# Patient Record
Sex: MALE | Race: WHITE
[De-identification: names, ages, dates, MRNs, and addresses within clinical notes are randomized per-mention and may not be internally consistent; named-entity substitution may affect disease eponyms.]

---

## 2017-04-18 ENCOUNTER — HOSPITAL ENCOUNTER (EMERGENCY)
Dept: HOSPITAL 8 - ED | Age: 57
Discharge: HOME | End: 2017-04-18
Payer: OTHER GOVERNMENT

## 2017-04-18 VITALS — BODY MASS INDEX: 24.7 KG/M2 | WEIGHT: 202.83 LBS | HEIGHT: 76 IN

## 2017-04-18 VITALS — SYSTOLIC BLOOD PRESSURE: 134 MMHG | DIASTOLIC BLOOD PRESSURE: 78 MMHG

## 2017-04-18 DIAGNOSIS — I10: ICD-10-CM

## 2017-04-18 DIAGNOSIS — S39.012A: Primary | ICD-10-CM
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
## 2017-04-18 DIAGNOSIS — I25.2: ICD-10-CM
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
## 2017-04-18 PROCEDURE — 72220 X-RAY EXAM SACRUM TAILBONE: CPT

## 2017-04-18 PROCEDURE — 72110 X-RAY EXAM L-2 SPINE 4/>VWS: CPT

## 2017-04-18 PROCEDURE — 73610 X-RAY EXAM OF ANKLE: CPT

## 2017-04-18 PROCEDURE — 99284 EMERGENCY DEPT VISIT MOD MDM: CPT

## 2017-04-18 PROCEDURE — 96372 THER/PROPH/DIAG INJ SC/IM: CPT

## 2017-12-19 ENCOUNTER — HOSPITAL ENCOUNTER (INPATIENT)
Dept: HOSPITAL 8 - ED | Age: 57
LOS: 6 days | Discharge: HOME | DRG: 493 | End: 2017-12-25
Attending: FAMILY MEDICINE | Admitting: FAMILY MEDICINE
Payer: MEDICARE

## 2017-12-19 VITALS — HEIGHT: 77 IN | WEIGHT: 194.89 LBS | BODY MASS INDEX: 23.01 KG/M2

## 2017-12-19 VITALS — SYSTOLIC BLOOD PRESSURE: 163 MMHG | DIASTOLIC BLOOD PRESSURE: 106 MMHG

## 2017-12-19 VITALS — DIASTOLIC BLOOD PRESSURE: 117 MMHG | SYSTOLIC BLOOD PRESSURE: 177 MMHG

## 2017-12-19 VITALS — DIASTOLIC BLOOD PRESSURE: 99 MMHG | SYSTOLIC BLOOD PRESSURE: 151 MMHG

## 2017-12-19 DIAGNOSIS — M19.071: ICD-10-CM

## 2017-12-19 DIAGNOSIS — Z82.49: ICD-10-CM

## 2017-12-19 DIAGNOSIS — G62.9: ICD-10-CM

## 2017-12-19 DIAGNOSIS — M11.271: ICD-10-CM

## 2017-12-19 DIAGNOSIS — Z79.2: ICD-10-CM

## 2017-12-19 DIAGNOSIS — I10: ICD-10-CM

## 2017-12-19 DIAGNOSIS — M00.9: Primary | ICD-10-CM

## 2017-12-19 DIAGNOSIS — M19.072: ICD-10-CM

## 2017-12-19 DIAGNOSIS — F43.10: ICD-10-CM

## 2017-12-19 DIAGNOSIS — L03.116: ICD-10-CM

## 2017-12-19 DIAGNOSIS — M11.272: ICD-10-CM

## 2017-12-19 DIAGNOSIS — M06.4: ICD-10-CM

## 2017-12-19 DIAGNOSIS — I25.2: ICD-10-CM

## 2017-12-19 DIAGNOSIS — Z59.0: ICD-10-CM

## 2017-12-19 LAB
AST SERPL-CCNC: 20 U/L (ref 15–37)
BUN SERPL-MCNC: 15 MG/DL (ref 7–18)
CRP SERPL-MCNC: 1 MG/DL (ref 0.02–0.49)
HCT VFR BLD CALC: 44.3 % (ref 39.2–51.8)
HGB BLD-MCNC: 14.9 G/DL (ref 13.7–18)
WBC # BLD AUTO: 12 X10^3/UL (ref 3.4–10)

## 2017-12-19 PROCEDURE — 87075 CULTR BACTERIA EXCEPT BLOOD: CPT

## 2017-12-19 PROCEDURE — 85651 RBC SED RATE NONAUTOMATED: CPT

## 2017-12-19 PROCEDURE — 86430 RHEUMATOID FACTOR TEST QUAL: CPT

## 2017-12-19 PROCEDURE — 84550 ASSAY OF BLOOD/URIC ACID: CPT

## 2017-12-19 PROCEDURE — 84520 ASSAY OF UREA NITROGEN: CPT

## 2017-12-19 PROCEDURE — 89060 EXAM SYNOVIAL FLUID CRYSTALS: CPT

## 2017-12-19 PROCEDURE — 87070 CULTURE OTHR SPECIMN AEROBIC: CPT

## 2017-12-19 PROCEDURE — A9585 GADOBUTROL INJECTION: HCPCS

## 2017-12-19 PROCEDURE — 85025 COMPLETE CBC W/AUTO DIFF WBC: CPT

## 2017-12-19 PROCEDURE — 86038 ANTINUCLEAR ANTIBODIES: CPT

## 2017-12-19 PROCEDURE — 36415 COLL VENOUS BLD VENIPUNCTURE: CPT

## 2017-12-19 PROCEDURE — 83735 ASSAY OF MAGNESIUM: CPT

## 2017-12-19 PROCEDURE — 72110 X-RAY EXAM L-2 SPINE 4/>VWS: CPT

## 2017-12-19 PROCEDURE — 96375 TX/PRO/DX INJ NEW DRUG ADDON: CPT

## 2017-12-19 PROCEDURE — 96374 THER/PROPH/DIAG INJ IV PUSH: CPT

## 2017-12-19 PROCEDURE — 87205 SMEAR GRAM STAIN: CPT

## 2017-12-19 PROCEDURE — 80053 COMPREHEN METABOLIC PANEL: CPT

## 2017-12-19 PROCEDURE — 86140 C-REACTIVE PROTEIN: CPT

## 2017-12-19 PROCEDURE — 86592 SYPHILIS TEST NON-TREP QUAL: CPT

## 2017-12-19 PROCEDURE — 80202 ASSAY OF VANCOMYCIN: CPT

## 2017-12-19 PROCEDURE — 80048 BASIC METABOLIC PNL TOTAL CA: CPT

## 2017-12-19 PROCEDURE — 82565 ASSAY OF CREATININE: CPT

## 2017-12-19 PROCEDURE — 83036 HEMOGLOBIN GLYCOSYLATED A1C: CPT

## 2017-12-19 RX ADMIN — CEFTRIAXONE SCH MLS/HR: 2 INJECTION, SOLUTION INTRAVENOUS at 17:27

## 2017-12-19 RX ADMIN — HYDROMORPHONE HYDROCHLORIDE PRN MG: 2 INJECTION INTRAMUSCULAR; INTRAVENOUS; SUBCUTANEOUS at 10:04

## 2017-12-19 RX ADMIN — HYDROMORPHONE HYDROCHLORIDE PRN MG: 2 INJECTION INTRAMUSCULAR; INTRAVENOUS; SUBCUTANEOUS at 13:35

## 2017-12-19 RX ADMIN — INSULIN LISPRO SCH NOTE: 100 INJECTION, SOLUTION INTRAVENOUS; SUBCUTANEOUS at 17:00

## 2017-12-19 RX ADMIN — HYDROMORPHONE HYDROCHLORIDE PRN MG: 2 INJECTION INTRAMUSCULAR; INTRAVENOUS; SUBCUTANEOUS at 10:05

## 2017-12-19 RX ADMIN — MORPHINE SULFATE SCH MG: 60 TABLET, EXTENDED RELEASE ORAL at 20:11

## 2017-12-19 RX ADMIN — INSULIN LISPRO SCH NOTE: 100 INJECTION, SOLUTION INTRAVENOUS; SUBCUTANEOUS at 17:06

## 2017-12-19 RX ADMIN — PREGABALIN SCH MG: 25 CAPSULE ORAL at 20:11

## 2017-12-19 RX ADMIN — HYDROMORPHONE HYDROCHLORIDE PRN MG: 2 INJECTION INTRAMUSCULAR; INTRAVENOUS; SUBCUTANEOUS at 09:47

## 2017-12-19 SDOH — ECONOMIC STABILITY - HOUSING INSECURITY: HOMELESSNESS: Z59.0

## 2017-12-20 VITALS — SYSTOLIC BLOOD PRESSURE: 147 MMHG | DIASTOLIC BLOOD PRESSURE: 90 MMHG

## 2017-12-20 VITALS — SYSTOLIC BLOOD PRESSURE: 177 MMHG | DIASTOLIC BLOOD PRESSURE: 99 MMHG

## 2017-12-20 VITALS — SYSTOLIC BLOOD PRESSURE: 154 MMHG | DIASTOLIC BLOOD PRESSURE: 92 MMHG

## 2017-12-20 VITALS — DIASTOLIC BLOOD PRESSURE: 75 MMHG | SYSTOLIC BLOOD PRESSURE: 128 MMHG

## 2017-12-20 VITALS — SYSTOLIC BLOOD PRESSURE: 131 MMHG | DIASTOLIC BLOOD PRESSURE: 85 MMHG

## 2017-12-20 RX ADMIN — CEFTRIAXONE SCH MLS/HR: 2 INJECTION, SOLUTION INTRAVENOUS at 20:07

## 2017-12-20 RX ADMIN — VANCOMYCIN HYDROCHLORIDE SCH MLS/HR: 1 INJECTION, POWDER, LYOPHILIZED, FOR SOLUTION INTRAVENOUS at 17:20

## 2017-12-20 RX ADMIN — KETOROLAC TROMETHAMINE SCH MG: 30 INJECTION, SOLUTION INTRAMUSCULAR at 12:30

## 2017-12-20 RX ADMIN — OXYCODONE HYDROCHLORIDE PRN MG: 5 TABLET ORAL at 12:57

## 2017-12-20 RX ADMIN — NICOTINE ONE PATCH: 21 PATCH, EXTENDED RELEASE TRANSDERMAL at 12:30

## 2017-12-20 RX ADMIN — MORPHINE SULFATE SCH MG: 60 TABLET, EXTENDED RELEASE ORAL at 20:10

## 2017-12-20 RX ADMIN — PREGABALIN SCH MG: 25 CAPSULE ORAL at 17:19

## 2017-12-20 RX ADMIN — INSULIN LISPRO SCH NOTE: 100 INJECTION, SOLUTION INTRAVENOUS; SUBCUTANEOUS at 17:06

## 2017-12-20 RX ADMIN — INSULIN LISPRO SCH NOTE: 100 INJECTION, SOLUTION INTRAVENOUS; SUBCUTANEOUS at 01:00

## 2017-12-20 RX ADMIN — OXYCODONE HYDROCHLORIDE PRN MG: 5 TABLET ORAL at 17:19

## 2017-12-20 RX ADMIN — INSULIN LISPRO SCH NOTE: 100 INJECTION, SOLUTION INTRAVENOUS; SUBCUTANEOUS at 17:00

## 2017-12-20 RX ADMIN — INSULIN LISPRO SCH NOTE: 100 INJECTION, SOLUTION INTRAVENOUS; SUBCUTANEOUS at 08:59

## 2017-12-20 RX ADMIN — PREGABALIN SCH MG: 25 CAPSULE ORAL at 09:08

## 2017-12-20 RX ADMIN — INSULIN LISPRO SCH NOTE: 100 INJECTION, SOLUTION INTRAVENOUS; SUBCUTANEOUS at 01:03

## 2017-12-20 RX ADMIN — CEFTRIAXONE SCH MLS/HR: 2 INJECTION, SOLUTION INTRAVENOUS at 20:50

## 2017-12-20 RX ADMIN — KETOROLAC TROMETHAMINE SCH MG: 30 INJECTION, SOLUTION INTRAMUSCULAR at 20:11

## 2017-12-20 RX ADMIN — PREGABALIN SCH MG: 25 CAPSULE ORAL at 20:10

## 2017-12-20 RX ADMIN — AMLODIPINE BESYLATE SCH MG: 5 TABLET ORAL at 09:08

## 2017-12-20 RX ADMIN — MORPHINE SULFATE SCH MG: 60 TABLET, EXTENDED RELEASE ORAL at 09:00

## 2017-12-21 VITALS — DIASTOLIC BLOOD PRESSURE: 100 MMHG | SYSTOLIC BLOOD PRESSURE: 170 MMHG

## 2017-12-21 VITALS — SYSTOLIC BLOOD PRESSURE: 163 MMHG | DIASTOLIC BLOOD PRESSURE: 98 MMHG

## 2017-12-21 VITALS — DIASTOLIC BLOOD PRESSURE: 94 MMHG | SYSTOLIC BLOOD PRESSURE: 138 MMHG

## 2017-12-21 VITALS — DIASTOLIC BLOOD PRESSURE: 83 MMHG | SYSTOLIC BLOOD PRESSURE: 152 MMHG

## 2017-12-21 LAB
ANA SER QL: NEGATIVE
BUN SERPL-MCNC: 23 MG/DL (ref 7–18)
HCT VFR BLD CALC: 38 % (ref 39.2–51.8)
HGB BLD-MCNC: 12.8 G/DL (ref 13.7–18)
RHEUMATOID FACT SER QL LA: NEGATIVE
WBC # BLD AUTO: 5.6 X10^3/UL (ref 3.4–10)

## 2017-12-21 PROCEDURE — 0SBG0ZZ EXCISION OF LEFT ANKLE JOINT, OPEN APPROACH: ICD-10-PCS | Performed by: ORTHOPAEDIC SURGERY

## 2017-12-21 PROCEDURE — 0S9G3ZZ DRAINAGE OF LEFT ANKLE JOINT, PERCUTANEOUS APPROACH: ICD-10-PCS | Performed by: ORTHOPAEDIC SURGERY

## 2017-12-21 RX ADMIN — VANCOMYCIN HYDROCHLORIDE SCH MLS/HR: 1 INJECTION, POWDER, LYOPHILIZED, FOR SOLUTION INTRAVENOUS at 13:59

## 2017-12-21 RX ADMIN — INSULIN LISPRO SCH NOTE: 100 INJECTION, SOLUTION INTRAVENOUS; SUBCUTANEOUS at 01:00

## 2017-12-21 RX ADMIN — PREGABALIN SCH MG: 25 CAPSULE ORAL at 20:16

## 2017-12-21 RX ADMIN — OXYCODONE HYDROCHLORIDE PRN MG: 5 TABLET ORAL at 13:59

## 2017-12-21 RX ADMIN — MORPHINE SULFATE SCH MG: 60 TABLET, EXTENDED RELEASE ORAL at 20:15

## 2017-12-21 RX ADMIN — CEFTRIAXONE SCH MLS/HR: 2 INJECTION, SOLUTION INTRAVENOUS at 22:42

## 2017-12-21 RX ADMIN — VANCOMYCIN HYDROCHLORIDE SCH MLS/HR: 1 INJECTION, POWDER, LYOPHILIZED, FOR SOLUTION INTRAVENOUS at 01:49

## 2017-12-21 RX ADMIN — PREGABALIN SCH MG: 25 CAPSULE ORAL at 15:45

## 2017-12-21 RX ADMIN — PREGABALIN SCH MG: 25 CAPSULE ORAL at 08:42

## 2017-12-21 RX ADMIN — INSULIN LISPRO SCH NOTE: 100 INJECTION, SOLUTION INTRAVENOUS; SUBCUTANEOUS at 01:06

## 2017-12-21 RX ADMIN — AMLODIPINE BESYLATE SCH MG: 5 TABLET ORAL at 08:42

## 2017-12-21 RX ADMIN — MORPHINE SULFATE SCH MG: 60 TABLET, EXTENDED RELEASE ORAL at 08:43

## 2017-12-22 VITALS — DIASTOLIC BLOOD PRESSURE: 86 MMHG | SYSTOLIC BLOOD PRESSURE: 152 MMHG

## 2017-12-22 VITALS — DIASTOLIC BLOOD PRESSURE: 74 MMHG | SYSTOLIC BLOOD PRESSURE: 123 MMHG

## 2017-12-22 VITALS — DIASTOLIC BLOOD PRESSURE: 66 MMHG | SYSTOLIC BLOOD PRESSURE: 118 MMHG

## 2017-12-22 VITALS — DIASTOLIC BLOOD PRESSURE: 80 MMHG | SYSTOLIC BLOOD PRESSURE: 122 MMHG

## 2017-12-22 RX ADMIN — PREGABALIN SCH MG: 25 CAPSULE ORAL at 16:32

## 2017-12-22 RX ADMIN — PREGABALIN SCH MG: 25 CAPSULE ORAL at 19:47

## 2017-12-22 RX ADMIN — OXYCODONE HYDROCHLORIDE PRN MG: 5 TABLET ORAL at 22:54

## 2017-12-22 RX ADMIN — OXYCODONE HYDROCHLORIDE PRN MG: 5 TABLET ORAL at 18:33

## 2017-12-22 RX ADMIN — CEFTRIAXONE SCH MLS/HR: 2 INJECTION, SOLUTION INTRAVENOUS at 19:47

## 2017-12-22 RX ADMIN — OXYCODONE HYDROCHLORIDE PRN MG: 5 TABLET ORAL at 05:25

## 2017-12-22 RX ADMIN — ACETAMINOPHEN PRN MG: 325 TABLET, FILM COATED ORAL at 22:54

## 2017-12-22 RX ADMIN — VANCOMYCIN HYDROCHLORIDE SCH MLS/HR: 1 INJECTION, POWDER, LYOPHILIZED, FOR SOLUTION INTRAVENOUS at 14:08

## 2017-12-22 RX ADMIN — PREGABALIN SCH MG: 25 CAPSULE ORAL at 08:07

## 2017-12-22 RX ADMIN — HEPARIN SODIUM SCH UNITS: 5000 INJECTION, SOLUTION INTRAVENOUS; SUBCUTANEOUS at 19:46

## 2017-12-22 RX ADMIN — ACETAMINOPHEN PRN MG: 325 TABLET, FILM COATED ORAL at 02:24

## 2017-12-22 RX ADMIN — OXYCODONE HYDROCHLORIDE PRN MG: 5 TABLET ORAL at 10:15

## 2017-12-22 RX ADMIN — AMLODIPINE BESYLATE SCH MG: 5 TABLET ORAL at 08:07

## 2017-12-22 RX ADMIN — MORPHINE SULFATE SCH MG: 60 TABLET, EXTENDED RELEASE ORAL at 19:47

## 2017-12-22 RX ADMIN — OXYCODONE HYDROCHLORIDE PRN MG: 5 TABLET ORAL at 14:10

## 2017-12-22 RX ADMIN — VANCOMYCIN HYDROCHLORIDE SCH MLS/HR: 1 INJECTION, POWDER, LYOPHILIZED, FOR SOLUTION INTRAVENOUS at 17:00

## 2017-12-22 RX ADMIN — MORPHINE SULFATE SCH MG: 60 TABLET, EXTENDED RELEASE ORAL at 08:07

## 2017-12-22 RX ADMIN — VANCOMYCIN HYDROCHLORIDE SCH MLS/HR: 1 INJECTION, POWDER, LYOPHILIZED, FOR SOLUTION INTRAVENOUS at 02:21

## 2017-12-23 VITALS — SYSTOLIC BLOOD PRESSURE: 150 MMHG | DIASTOLIC BLOOD PRESSURE: 79 MMHG

## 2017-12-23 VITALS — SYSTOLIC BLOOD PRESSURE: 155 MMHG | DIASTOLIC BLOOD PRESSURE: 94 MMHG

## 2017-12-23 VITALS — SYSTOLIC BLOOD PRESSURE: 150 MMHG | DIASTOLIC BLOOD PRESSURE: 91 MMHG

## 2017-12-23 VITALS — SYSTOLIC BLOOD PRESSURE: 169 MMHG | DIASTOLIC BLOOD PRESSURE: 99 MMHG

## 2017-12-23 RX ADMIN — OXYCODONE HYDROCHLORIDE PRN MG: 5 TABLET ORAL at 16:17

## 2017-12-23 RX ADMIN — MORPHINE SULFATE SCH MG: 60 TABLET, EXTENDED RELEASE ORAL at 08:35

## 2017-12-23 RX ADMIN — AMLODIPINE BESYLATE SCH MG: 5 TABLET ORAL at 08:42

## 2017-12-23 RX ADMIN — VANCOMYCIN HYDROCHLORIDE SCH MLS/HR: 1 INJECTION, POWDER, LYOPHILIZED, FOR SOLUTION INTRAVENOUS at 18:14

## 2017-12-23 RX ADMIN — PREGABALIN SCH MG: 25 CAPSULE ORAL at 15:11

## 2017-12-23 RX ADMIN — HEPARIN SODIUM SCH UNITS: 5000 INJECTION, SOLUTION INTRAVENOUS; SUBCUTANEOUS at 15:11

## 2017-12-23 RX ADMIN — PREGABALIN SCH MG: 25 CAPSULE ORAL at 20:18

## 2017-12-23 RX ADMIN — OXYCODONE HYDROCHLORIDE PRN MG: 5 TABLET ORAL at 08:35

## 2017-12-23 RX ADMIN — CEFTRIAXONE SCH MLS/HR: 2 INJECTION, SOLUTION INTRAVENOUS at 20:19

## 2017-12-23 RX ADMIN — OXYCODONE HYDROCHLORIDE PRN MG: 5 TABLET ORAL at 02:54

## 2017-12-23 RX ADMIN — ACETAMINOPHEN PRN MG: 325 TABLET, FILM COATED ORAL at 02:54

## 2017-12-23 RX ADMIN — VANCOMYCIN HYDROCHLORIDE SCH MLS/HR: 1 INJECTION, POWDER, LYOPHILIZED, FOR SOLUTION INTRAVENOUS at 04:52

## 2017-12-23 RX ADMIN — HEPARIN SODIUM SCH UNITS: 5000 INJECTION, SOLUTION INTRAVENOUS; SUBCUTANEOUS at 05:04

## 2017-12-23 RX ADMIN — HEPARIN SODIUM SCH UNITS: 5000 INJECTION, SOLUTION INTRAVENOUS; SUBCUTANEOUS at 20:20

## 2017-12-23 RX ADMIN — PREGABALIN SCH MG: 25 CAPSULE ORAL at 08:42

## 2017-12-23 RX ADMIN — MORPHINE SULFATE SCH MG: 60 TABLET, EXTENDED RELEASE ORAL at 20:18

## 2017-12-23 RX ADMIN — DOCUSATE SODIUM SCH MG: 100 CAPSULE, LIQUID FILLED ORAL at 20:56

## 2017-12-24 VITALS — DIASTOLIC BLOOD PRESSURE: 88 MMHG | SYSTOLIC BLOOD PRESSURE: 137 MMHG

## 2017-12-24 VITALS — DIASTOLIC BLOOD PRESSURE: 88 MMHG | SYSTOLIC BLOOD PRESSURE: 152 MMHG

## 2017-12-24 VITALS — SYSTOLIC BLOOD PRESSURE: 151 MMHG | DIASTOLIC BLOOD PRESSURE: 92 MMHG

## 2017-12-24 VITALS — SYSTOLIC BLOOD PRESSURE: 150 MMHG | DIASTOLIC BLOOD PRESSURE: 91 MMHG

## 2017-12-24 LAB — BUN SERPL-MCNC: 12 MG/DL (ref 7–18)

## 2017-12-24 RX ADMIN — VANCOMYCIN HYDROCHLORIDE SCH MLS/HR: 1 INJECTION, POWDER, LYOPHILIZED, FOR SOLUTION INTRAVENOUS at 05:40

## 2017-12-24 RX ADMIN — PREGABALIN SCH MG: 25 CAPSULE ORAL at 21:06

## 2017-12-24 RX ADMIN — OXYCODONE HYDROCHLORIDE PRN MG: 5 TABLET ORAL at 14:41

## 2017-12-24 RX ADMIN — HEPARIN SODIUM SCH UNITS: 5000 INJECTION, SOLUTION INTRAVENOUS; SUBCUTANEOUS at 04:34

## 2017-12-24 RX ADMIN — ACETAMINOPHEN PRN MG: 325 TABLET, FILM COATED ORAL at 03:18

## 2017-12-24 RX ADMIN — OXYCODONE HYDROCHLORIDE PRN MG: 5 TABLET ORAL at 03:18

## 2017-12-24 RX ADMIN — OXYCODONE HYDROCHLORIDE PRN MG: 5 TABLET ORAL at 09:11

## 2017-12-24 RX ADMIN — ACETAMINOPHEN PRN MG: 325 TABLET, FILM COATED ORAL at 09:11

## 2017-12-24 RX ADMIN — DOCUSATE SODIUM SCH MG: 100 CAPSULE, LIQUID FILLED ORAL at 09:00

## 2017-12-24 RX ADMIN — HEPARIN SODIUM SCH UNITS: 5000 INJECTION, SOLUTION INTRAVENOUS; SUBCUTANEOUS at 21:08

## 2017-12-24 RX ADMIN — MORPHINE SULFATE SCH MG: 60 TABLET, EXTENDED RELEASE ORAL at 21:00

## 2017-12-24 RX ADMIN — PREGABALIN SCH MG: 25 CAPSULE ORAL at 09:11

## 2017-12-24 RX ADMIN — AMLODIPINE BESYLATE SCH MG: 5 TABLET ORAL at 09:10

## 2017-12-24 RX ADMIN — MORPHINE SULFATE SCH MG: 60 TABLET, EXTENDED RELEASE ORAL at 10:51

## 2017-12-24 RX ADMIN — OXYCODONE HYDROCHLORIDE PRN MG: 5 TABLET ORAL at 22:16

## 2017-12-24 RX ADMIN — DOCUSATE SODIUM SCH MG: 100 CAPSULE, LIQUID FILLED ORAL at 21:06

## 2017-12-24 RX ADMIN — HEPARIN SODIUM SCH UNITS: 5000 INJECTION, SOLUTION INTRAVENOUS; SUBCUTANEOUS at 14:41

## 2017-12-24 RX ADMIN — PREGABALIN SCH MG: 25 CAPSULE ORAL at 17:03

## 2017-12-25 VITALS — SYSTOLIC BLOOD PRESSURE: 155 MMHG | DIASTOLIC BLOOD PRESSURE: 86 MMHG

## 2017-12-25 VITALS — DIASTOLIC BLOOD PRESSURE: 99 MMHG | SYSTOLIC BLOOD PRESSURE: 150 MMHG

## 2017-12-25 VITALS — DIASTOLIC BLOOD PRESSURE: 98 MMHG | SYSTOLIC BLOOD PRESSURE: 160 MMHG

## 2017-12-25 LAB
BUN SERPL-MCNC: 15 MG/DL (ref 7–18)
HCT VFR BLD CALC: 37.8 % (ref 39.2–51.8)
HGB BLD-MCNC: 12.7 G/DL (ref 13.7–18)
WBC # BLD AUTO: 5.6 X10^3/UL (ref 3.4–10)

## 2017-12-25 RX ADMIN — AMLODIPINE BESYLATE SCH MG: 5 TABLET ORAL at 07:52

## 2017-12-25 RX ADMIN — HEPARIN SODIUM SCH UNITS: 5000 INJECTION, SOLUTION INTRAVENOUS; SUBCUTANEOUS at 05:00

## 2017-12-25 RX ADMIN — PREGABALIN SCH MG: 25 CAPSULE ORAL at 07:53

## 2017-12-25 RX ADMIN — MORPHINE SULFATE SCH MG: 60 TABLET, EXTENDED RELEASE ORAL at 07:53

## 2017-12-25 RX ADMIN — OXYCODONE HYDROCHLORIDE PRN MG: 5 TABLET ORAL at 02:44

## 2017-12-25 RX ADMIN — OXYCODONE HYDROCHLORIDE PRN MG: 5 TABLET ORAL at 07:53

## 2017-12-25 RX ADMIN — HEPARIN SODIUM SCH UNITS: 5000 INJECTION, SOLUTION INTRAVENOUS; SUBCUTANEOUS at 13:00

## 2017-12-25 RX ADMIN — DOCUSATE SODIUM SCH MG: 100 CAPSULE, LIQUID FILLED ORAL at 07:52

## 2017-12-26 ENCOUNTER — HOSPITAL ENCOUNTER (EMERGENCY)
Dept: HOSPITAL 8 - ED | Age: 57
Discharge: HOME | End: 2017-12-26
Payer: MEDICARE

## 2017-12-26 VITALS — DIASTOLIC BLOOD PRESSURE: 97 MMHG | SYSTOLIC BLOOD PRESSURE: 145 MMHG

## 2017-12-26 VITALS — WEIGHT: 199.3 LBS | HEIGHT: 76 IN | BODY MASS INDEX: 24.27 KG/M2

## 2017-12-26 DIAGNOSIS — M96.89: ICD-10-CM

## 2017-12-26 DIAGNOSIS — M19.072: ICD-10-CM

## 2017-12-26 DIAGNOSIS — L03.116: Primary | ICD-10-CM

## 2017-12-26 DIAGNOSIS — I10: ICD-10-CM

## 2017-12-26 DIAGNOSIS — I25.2: ICD-10-CM

## 2017-12-26 DIAGNOSIS — Z98.890: ICD-10-CM

## 2017-12-26 LAB
AST SERPL-CCNC: 14 U/L (ref 15–37)
BUN SERPL-MCNC: 17 MG/DL (ref 7–18)
HCT VFR BLD CALC: 38.5 % (ref 39.2–51.8)
HGB BLD-MCNC: 12.9 G/DL (ref 13.7–18)
WBC # BLD AUTO: 6.9 X10^3/UL (ref 3.4–10)

## 2017-12-26 PROCEDURE — 96372 THER/PROPH/DIAG INJ SC/IM: CPT

## 2017-12-26 PROCEDURE — 85025 COMPLETE CBC W/AUTO DIFF WBC: CPT

## 2017-12-26 PROCEDURE — 93971 EXTREMITY STUDY: CPT

## 2017-12-26 PROCEDURE — 80053 COMPREHEN METABOLIC PANEL: CPT

## 2017-12-26 PROCEDURE — 36415 COLL VENOUS BLD VENIPUNCTURE: CPT

## 2017-12-26 PROCEDURE — 99285 EMERGENCY DEPT VISIT HI MDM: CPT

## 2017-12-26 PROCEDURE — 73610 X-RAY EXAM OF ANKLE: CPT

## 2018-01-27 ENCOUNTER — HOSPITAL ENCOUNTER (EMERGENCY)
Dept: HOSPITAL 8 - ED | Age: 58
Discharge: HOME | End: 2018-01-27
Payer: OTHER GOVERNMENT

## 2018-01-27 VITALS — SYSTOLIC BLOOD PRESSURE: 183 MMHG | DIASTOLIC BLOOD PRESSURE: 112 MMHG

## 2018-01-27 VITALS — WEIGHT: 207.23 LBS | HEIGHT: 76 IN | BODY MASS INDEX: 25.24 KG/M2

## 2018-01-27 DIAGNOSIS — F17.200: ICD-10-CM

## 2018-01-27 DIAGNOSIS — I82.4Z2: Primary | ICD-10-CM

## 2018-01-27 DIAGNOSIS — I10: ICD-10-CM

## 2018-01-27 LAB
ALBUMIN SERPL-MCNC: 3.3 G/DL (ref 3.4–5)
ALP SERPL-CCNC: 136 U/L (ref 45–117)
ALT SERPL-CCNC: 28 U/L (ref 12–78)
ANION GAP SERPL CALC-SCNC: 7 MMOL/L (ref 5–15)
APTT BLD: 29 SECONDS (ref 25–31)
BASOPHILS # BLD AUTO: 0.02 X10^3/UL (ref 0–0.1)
BASOPHILS NFR BLD AUTO: 1 % (ref 0–1)
BILIRUB SERPL-MCNC: 0.4 MG/DL (ref 0.2–1)
CALCIUM SERPL-MCNC: 7.9 MG/DL (ref 8.5–10.1)
CHLORIDE SERPL-SCNC: 108 MMOL/L (ref 98–107)
CREAT SERPL-MCNC: 0.75 MG/DL (ref 0.7–1.3)
EOSINOPHIL # BLD AUTO: 0.09 X10^3/UL (ref 0–0.4)
EOSINOPHIL NFR BLD AUTO: 3 % (ref 1–7)
ERYTHROCYTE [DISTWIDTH] IN BLOOD BY AUTOMATED COUNT: 15 % (ref 9.4–14.8)
INR PPP: 0.96 (ref 0.93–1.1)
LYMPHOCYTES # BLD AUTO: 0.72 X10^3/UL (ref 1–3.4)
LYMPHOCYTES NFR BLD AUTO: 21 % (ref 22–44)
MCH RBC QN AUTO: 30.1 PG (ref 27.5–34.5)
MCHC RBC AUTO-ENTMCNC: 33.5 G/DL (ref 33.2–36.2)
MCV RBC AUTO: 89.7 FL (ref 81–97)
MD: (no result)
MONOCYTES # BLD AUTO: 0.34 X10^3/UL (ref 0.2–0.8)
MONOCYTES NFR BLD AUTO: 10 % (ref 2–9)
NEUTROPHILS # BLD AUTO: 2.24 X10^3/UL (ref 1.8–6.8)
NEUTROPHILS NFR BLD AUTO: 66 % (ref 42–75)
PLATELET # BLD AUTO: 205 X10^3/UL (ref 130–400)
PMV BLD AUTO: 8.3 FL (ref 7.4–10.4)
PROT SERPL-MCNC: 6.4 G/DL (ref 6.4–8.2)
PROTHROMBIN TIME: 10 SECONDS (ref 9.6–11.5)
RBC # BLD AUTO: 4.32 X10^6/UL (ref 4.38–5.82)

## 2018-01-27 PROCEDURE — 96372 THER/PROPH/DIAG INJ SC/IM: CPT

## 2018-01-27 PROCEDURE — 93971 EXTREMITY STUDY: CPT

## 2018-01-27 PROCEDURE — 80053 COMPREHEN METABOLIC PANEL: CPT

## 2018-01-27 PROCEDURE — 36415 COLL VENOUS BLD VENIPUNCTURE: CPT

## 2018-01-27 PROCEDURE — 85025 COMPLETE CBC W/AUTO DIFF WBC: CPT

## 2018-01-27 PROCEDURE — 99285 EMERGENCY DEPT VISIT HI MDM: CPT

## 2018-01-27 PROCEDURE — 85610 PROTHROMBIN TIME: CPT

## 2018-01-27 PROCEDURE — 85730 THROMBOPLASTIN TIME PARTIAL: CPT

## 2018-02-20 ENCOUNTER — HOSPITAL ENCOUNTER (EMERGENCY)
Dept: HOSPITAL 8 - ED | Age: 58
LOS: 1 days | Discharge: HOME | End: 2018-02-21
Payer: OTHER GOVERNMENT

## 2018-02-20 VITALS — BODY MASS INDEX: 25.58 KG/M2 | WEIGHT: 199.3 LBS | HEIGHT: 74 IN

## 2018-02-20 DIAGNOSIS — R60.0: ICD-10-CM

## 2018-02-20 DIAGNOSIS — R53.1: ICD-10-CM

## 2018-02-20 DIAGNOSIS — M25.572: Primary | ICD-10-CM

## 2018-02-20 DIAGNOSIS — I10: ICD-10-CM

## 2018-02-20 PROCEDURE — 99284 EMERGENCY DEPT VISIT MOD MDM: CPT

## 2018-02-21 VITALS — SYSTOLIC BLOOD PRESSURE: 178 MMHG | DIASTOLIC BLOOD PRESSURE: 110 MMHG

## 2025-01-12 ENCOUNTER — APPOINTMENT (OUTPATIENT)
Dept: RADIOLOGY | Facility: MEDICAL CENTER | Age: 65
DRG: 513 | End: 2025-01-12
Attending: EMERGENCY MEDICINE
Payer: COMMERCIAL

## 2025-01-12 ENCOUNTER — HOSPITAL ENCOUNTER (INPATIENT)
Facility: MEDICAL CENTER | Age: 65
LOS: 5 days | DRG: 513 | End: 2025-01-17
Attending: EMERGENCY MEDICINE | Admitting: STUDENT IN AN ORGANIZED HEALTH CARE EDUCATION/TRAINING PROGRAM
Payer: COMMERCIAL

## 2025-01-12 ENCOUNTER — APPOINTMENT (OUTPATIENT)
Dept: RADIOLOGY | Facility: MEDICAL CENTER | Age: 65
DRG: 513 | End: 2025-01-12
Attending: STUDENT IN AN ORGANIZED HEALTH CARE EDUCATION/TRAINING PROGRAM
Payer: COMMERCIAL

## 2025-01-12 DIAGNOSIS — N20.1 URETERAL STONE: ICD-10-CM

## 2025-01-12 DIAGNOSIS — M25.531 RIGHT WRIST PAIN: ICD-10-CM

## 2025-01-12 DIAGNOSIS — M00.9 PYOGENIC ARTHRITIS OF RIGHT WRIST, DUE TO UNSPECIFIED ORGANISM (HCC): ICD-10-CM

## 2025-01-12 DIAGNOSIS — R07.89 OTHER CHEST PAIN: ICD-10-CM

## 2025-01-12 PROBLEM — I16.0 HYPERTENSIVE URGENCY: Status: ACTIVE | Noted: 2025-01-12

## 2025-01-12 PROBLEM — Z71.89 ADVANCE CARE PLANNING: Status: ACTIVE | Noted: 2025-01-12

## 2025-01-12 PROBLEM — E87.6 HYPOKALEMIA: Status: ACTIVE | Noted: 2025-01-12

## 2025-01-12 PROBLEM — Z59.00 HOMELESS: Status: ACTIVE | Noted: 2025-01-12

## 2025-01-12 PROBLEM — F19.10 DRUG ABUSE (HCC): Status: ACTIVE | Noted: 2025-01-12

## 2025-01-12 PROBLEM — J96.01 ACUTE HYPOXIC RESPIRATORY FAILURE (HCC): Status: ACTIVE | Noted: 2025-01-12

## 2025-01-12 PROBLEM — D72.829 LEUKOCYTOSIS: Status: ACTIVE | Noted: 2025-01-12

## 2025-01-12 PROBLEM — N13.30 HYDRONEPHROSIS: Status: ACTIVE | Noted: 2025-01-12

## 2025-01-12 LAB
ALBUMIN SERPL BCP-MCNC: 3.6 G/DL (ref 3.2–4.9)
ALBUMIN/GLOB SERPL: 1.2 G/DL
ALP SERPL-CCNC: 136 U/L (ref 30–99)
ALT SERPL-CCNC: 13 U/L (ref 2–50)
AMPHET UR QL SCN: POSITIVE
ANION GAP SERPL CALC-SCNC: 9 MMOL/L (ref 7–16)
APPEARANCE UR: CLEAR
AST SERPL-CCNC: 23 U/L (ref 12–45)
BACTERIA #/AREA URNS HPF: ABNORMAL /HPF
BARBITURATES UR QL SCN: NEGATIVE
BASOPHILS # BLD AUTO: 0.3 % (ref 0–1.8)
BASOPHILS # BLD: 0.04 K/UL (ref 0–0.12)
BENZODIAZ UR QL SCN: NEGATIVE
BILIRUB SERPL-MCNC: 0.2 MG/DL (ref 0.1–1.5)
BILIRUB UR QL STRIP.AUTO: NEGATIVE
BUN SERPL-MCNC: 16 MG/DL (ref 8–22)
BZE UR QL SCN: NEGATIVE
CA OXALATE CRYSTAL  1765: PRESENT /HPF
CALCIUM ALBUM COR SERPL-MCNC: 9.6 MG/DL (ref 8.5–10.5)
CALCIUM SERPL-MCNC: 9.3 MG/DL (ref 8.5–10.5)
CANNABINOIDS UR QL SCN: NEGATIVE
CASTS URNS QL MICRO: ABNORMAL /LPF (ref 0–2)
CHLORIDE SERPL-SCNC: 103 MMOL/L (ref 96–112)
CHOLEST SERPL-MCNC: 154 MG/DL (ref 100–199)
CO2 SERPL-SCNC: 27 MMOL/L (ref 20–33)
COLOR UR: YELLOW
CREAT SERPL-MCNC: 1.08 MG/DL (ref 0.5–1.4)
CRP SERPL HS-MCNC: 1.84 MG/DL (ref 0–0.75)
CRP SERPL HS-MCNC: 3.17 MG/DL (ref 0–0.75)
DEPRECATED # ENTITIES SPRM: PRESENT /HPF
EKG IMPRESSION: NORMAL
EKG IMPRESSION: NORMAL
EOSINOPHIL # BLD AUTO: 0.13 K/UL (ref 0–0.51)
EOSINOPHIL NFR BLD: 1 % (ref 0–6.9)
EPITHELIAL CELLS 1715: ABNORMAL /HPF (ref 0–5)
ERYTHROCYTE [DISTWIDTH] IN BLOOD BY AUTOMATED COUNT: 46.3 FL (ref 35.9–50)
ERYTHROCYTE [SEDIMENTATION RATE] IN BLOOD BY WESTERGREN METHOD: 26 MM/HOUR (ref 0–20)
ERYTHROCYTE [SEDIMENTATION RATE] IN BLOOD BY WESTERGREN METHOD: 34 MM/HOUR (ref 0–20)
EST. AVERAGE GLUCOSE BLD GHB EST-MCNC: 120 MG/DL
FENTANYL UR QL: NEGATIVE
GFR SERPLBLD CREATININE-BSD FMLA CKD-EPI: 76 ML/MIN/1.73 M 2
GLOBULIN SER CALC-MCNC: 3 G/DL (ref 1.9–3.5)
GLUCOSE SERPL-MCNC: 81 MG/DL (ref 65–99)
GLUCOSE UR STRIP.AUTO-MCNC: NEGATIVE MG/DL
GRAM STN SPEC: NORMAL
HBA1C MFR BLD: 5.8 % (ref 4–5.6)
HCT VFR BLD AUTO: 43.1 % (ref 42–52)
HDLC SERPL-MCNC: 31 MG/DL
HGB BLD-MCNC: 14 G/DL (ref 14–18)
HYALINE CAST   1831: PRESENT /LPF
IMM GRANULOCYTES # BLD AUTO: 0.06 K/UL (ref 0–0.11)
IMM GRANULOCYTES NFR BLD AUTO: 0.5 % (ref 0–0.9)
KETONES UR STRIP.AUTO-MCNC: NEGATIVE MG/DL
LDLC SERPL CALC-MCNC: 94 MG/DL
LEUKOCYTE ESTERASE UR QL STRIP.AUTO: NEGATIVE
LYMPHOCYTES # BLD AUTO: 1.47 K/UL (ref 1–4.8)
LYMPHOCYTES NFR BLD: 11.1 % (ref 22–41)
MCH RBC QN AUTO: 30.2 PG (ref 27–33)
MCHC RBC AUTO-ENTMCNC: 32.5 G/DL (ref 32.3–36.5)
MCV RBC AUTO: 92.9 FL (ref 81.4–97.8)
METHADONE UR QL SCN: NEGATIVE
MICRO URNS: ABNORMAL
MONOCYTES # BLD AUTO: 1.07 K/UL (ref 0–0.85)
MONOCYTES NFR BLD AUTO: 8.1 % (ref 0–13.4)
MUCOUS THREADS URNS QL MICRO: PRESENT /HPF
NEUTROPHILS # BLD AUTO: 10.5 K/UL (ref 1.82–7.42)
NEUTROPHILS NFR BLD: 79 % (ref 44–72)
NITRITE UR QL STRIP.AUTO: NEGATIVE
NRBC # BLD AUTO: 0 K/UL
NRBC BLD-RTO: 0 /100 WBC (ref 0–0.2)
NT-PROBNP SERPL IA-MCNC: 910 PG/ML (ref 0–125)
OPIATES UR QL SCN: NEGATIVE
OXYCODONE UR QL SCN: NEGATIVE
PCP UR QL SCN: NEGATIVE
PH UR STRIP.AUTO: 5.5 [PH] (ref 5–8)
PLATELET # BLD AUTO: 314 K/UL (ref 164–446)
PMV BLD AUTO: 9.8 FL (ref 9–12.9)
POTASSIUM SERPL-SCNC: 3.3 MMOL/L (ref 3.6–5.5)
PROPOXYPH UR QL SCN: NEGATIVE
PROT SERPL-MCNC: 6.6 G/DL (ref 6–8.2)
PROT UR QL STRIP: 30 MG/DL
RBC # BLD AUTO: 4.64 M/UL (ref 4.7–6.1)
RBC # URNS HPF: ABNORMAL /HPF (ref 0–2)
RBC UR QL AUTO: NEGATIVE
SIGNIFICANT IND 70042: NORMAL
SITE SITE: NORMAL
SODIUM SERPL-SCNC: 139 MMOL/L (ref 135–145)
SOURCE SOURCE: NORMAL
SP GR UR STRIP.AUTO: 1.02
TRIGL SERPL-MCNC: 144 MG/DL (ref 0–149)
TROPONIN T SERPL-MCNC: 17 NG/L (ref 6–19)
TROPONIN T SERPL-MCNC: 19 NG/L (ref 6–19)
TROPONIN T SERPL-MCNC: 20 NG/L (ref 6–19)
TSH SERPL DL<=0.005 MIU/L-ACNC: 1.03 UIU/ML (ref 0.38–5.33)
URATE SERPL-MCNC: 6.5 MG/DL (ref 2.5–8.3)
UROBILINOGEN UR STRIP.AUTO-MCNC: 1 EU/DL
WBC # BLD AUTO: 13.3 K/UL (ref 4.8–10.8)
WBC #/AREA URNS HPF: ABNORMAL /HPF

## 2025-01-12 PROCEDURE — 96376 TX/PRO/DX INJ SAME DRUG ADON: CPT

## 2025-01-12 PROCEDURE — 99223 1ST HOSP IP/OBS HIGH 75: CPT | Performed by: STUDENT IN AN ORGANIZED HEALTH CARE EDUCATION/TRAINING PROGRAM

## 2025-01-12 PROCEDURE — 71275 CT ANGIOGRAPHY CHEST: CPT

## 2025-01-12 PROCEDURE — 85025 COMPLETE CBC W/AUTO DIFF WBC: CPT

## 2025-01-12 PROCEDURE — 770020 HCHG ROOM/CARE - TELE (206)

## 2025-01-12 PROCEDURE — 84443 ASSAY THYROID STIM HORMONE: CPT

## 2025-01-12 PROCEDURE — 93005 ELECTROCARDIOGRAM TRACING: CPT | Mod: TC

## 2025-01-12 PROCEDURE — 81001 URINALYSIS AUTO W/SCOPE: CPT

## 2025-01-12 PROCEDURE — 84484 ASSAY OF TROPONIN QUANT: CPT | Mod: 91

## 2025-01-12 PROCEDURE — 700102 HCHG RX REV CODE 250 W/ 637 OVERRIDE(OP)

## 2025-01-12 PROCEDURE — 83036 HEMOGLOBIN GLYCOSYLATED A1C: CPT

## 2025-01-12 PROCEDURE — A9270 NON-COVERED ITEM OR SERVICE: HCPCS

## 2025-01-12 PROCEDURE — 96365 THER/PROPH/DIAG IV INF INIT: CPT

## 2025-01-12 PROCEDURE — 73100 X-RAY EXAM OF WRIST: CPT | Mod: RT

## 2025-01-12 PROCEDURE — 87205 SMEAR GRAM STAIN: CPT

## 2025-01-12 PROCEDURE — 36415 COLL VENOUS BLD VENIPUNCTURE: CPT

## 2025-01-12 PROCEDURE — 20605 DRAIN/INJ JOINT/BURSA W/O US: CPT

## 2025-01-12 PROCEDURE — 96375 TX/PRO/DX INJ NEW DRUG ADDON: CPT

## 2025-01-12 PROCEDURE — A9270 NON-COVERED ITEM OR SERVICE: HCPCS | Performed by: STUDENT IN AN ORGANIZED HEALTH CARE EDUCATION/TRAINING PROGRAM

## 2025-01-12 PROCEDURE — 93931 UPPER EXTREMITY STUDY: CPT | Mod: RT

## 2025-01-12 PROCEDURE — 96372 THER/PROPH/DIAG INJ SC/IM: CPT

## 2025-01-12 PROCEDURE — 700111 HCHG RX REV CODE 636 W/ 250 OVERRIDE (IP): Mod: JZ | Performed by: EMERGENCY MEDICINE

## 2025-01-12 PROCEDURE — 80053 COMPREHEN METABOLIC PANEL: CPT

## 2025-01-12 PROCEDURE — 76775 US EXAM ABDO BACK WALL LIM: CPT

## 2025-01-12 PROCEDURE — 71045 X-RAY EXAM CHEST 1 VIEW: CPT

## 2025-01-12 PROCEDURE — 700101 HCHG RX REV CODE 250: Performed by: EMERGENCY MEDICINE

## 2025-01-12 PROCEDURE — 99285 EMERGENCY DEPT VISIT HI MDM: CPT

## 2025-01-12 PROCEDURE — 85652 RBC SED RATE AUTOMATED: CPT

## 2025-01-12 PROCEDURE — 87070 CULTURE OTHR SPECIMN AEROBIC: CPT

## 2025-01-12 PROCEDURE — 700102 HCHG RX REV CODE 250 W/ 637 OVERRIDE(OP): Performed by: STUDENT IN AN ORGANIZED HEALTH CARE EDUCATION/TRAINING PROGRAM

## 2025-01-12 PROCEDURE — 700117 HCHG RX CONTRAST REV CODE 255: Performed by: EMERGENCY MEDICINE

## 2025-01-12 PROCEDURE — 83880 ASSAY OF NATRIURETIC PEPTIDE: CPT

## 2025-01-12 PROCEDURE — 86140 C-REACTIVE PROTEIN: CPT

## 2025-01-12 PROCEDURE — 700111 HCHG RX REV CODE 636 W/ 250 OVERRIDE (IP): Mod: JZ | Performed by: STUDENT IN AN ORGANIZED HEALTH CARE EDUCATION/TRAINING PROGRAM

## 2025-01-12 PROCEDURE — 80307 DRUG TEST PRSMV CHEM ANLYZR: CPT

## 2025-01-12 PROCEDURE — 93005 ELECTROCARDIOGRAM TRACING: CPT | Mod: TC | Performed by: EMERGENCY MEDICINE

## 2025-01-12 PROCEDURE — 700105 HCHG RX REV CODE 258: Performed by: EMERGENCY MEDICINE

## 2025-01-12 PROCEDURE — 84550 ASSAY OF BLOOD/URIC ACID: CPT

## 2025-01-12 PROCEDURE — 80061 LIPID PANEL: CPT

## 2025-01-12 RX ORDER — ROSUVASTATIN CALCIUM 20 MG/1
40 TABLET, COATED ORAL DAILY
Status: DISCONTINUED | OUTPATIENT
Start: 2025-01-13 | End: 2025-01-17 | Stop reason: HOSPADM

## 2025-01-12 RX ORDER — AMLODIPINE BESYLATE 5 MG/1
5 TABLET ORAL DAILY
COMMUNITY

## 2025-01-12 RX ORDER — MORPHINE SULFATE 4 MG/ML
4 INJECTION INTRAVENOUS
Status: DISCONTINUED | OUTPATIENT
Start: 2025-01-12 | End: 2025-01-17 | Stop reason: HOSPADM

## 2025-01-12 RX ORDER — LOSARTAN POTASSIUM 50 MG/1
50 TABLET ORAL DAILY
Status: ON HOLD | COMMUNITY
End: 2025-01-17

## 2025-01-12 RX ORDER — OXYCODONE HYDROCHLORIDE 5 MG/1
5 TABLET ORAL
Status: DISCONTINUED | OUTPATIENT
Start: 2025-01-12 | End: 2025-01-17 | Stop reason: HOSPADM

## 2025-01-12 RX ORDER — POTASSIUM CHLORIDE 1500 MG/1
40 TABLET, EXTENDED RELEASE ORAL ONCE
Status: COMPLETED | OUTPATIENT
Start: 2025-01-12 | End: 2025-01-12

## 2025-01-12 RX ORDER — ENOXAPARIN SODIUM 100 MG/ML
40 INJECTION SUBCUTANEOUS DAILY
Status: DISCONTINUED | OUTPATIENT
Start: 2025-01-12 | End: 2025-01-17 | Stop reason: HOSPADM

## 2025-01-12 RX ORDER — MORPHINE SULFATE 4 MG/ML
4 INJECTION INTRAVENOUS ONCE
Status: COMPLETED | OUTPATIENT
Start: 2025-01-12 | End: 2025-01-12

## 2025-01-12 RX ORDER — OXYCODONE HYDROCHLORIDE 10 MG/1
10 TABLET ORAL
Status: DISCONTINUED | OUTPATIENT
Start: 2025-01-12 | End: 2025-01-17 | Stop reason: HOSPADM

## 2025-01-12 RX ORDER — FINASTERIDE 5 MG/1
5 TABLET, FILM COATED ORAL DAILY
Status: DISCONTINUED | OUTPATIENT
Start: 2025-01-13 | End: 2025-01-17 | Stop reason: HOSPADM

## 2025-01-12 RX ORDER — SACUBITRIL AND VALSARTAN 49; 51 MG/1; MG/1
1 TABLET, FILM COATED ORAL 2 TIMES DAILY
COMMUNITY

## 2025-01-12 RX ORDER — CARVEDILOL 12.5 MG/1
12.5 TABLET ORAL 2 TIMES DAILY WITH MEALS
Status: DISCONTINUED | OUTPATIENT
Start: 2025-01-12 | End: 2025-01-17 | Stop reason: HOSPADM

## 2025-01-12 RX ORDER — ONDANSETRON 4 MG/1
4 TABLET, ORALLY DISINTEGRATING ORAL EVERY 4 HOURS PRN
Status: DISCONTINUED | OUTPATIENT
Start: 2025-01-12 | End: 2025-01-17 | Stop reason: HOSPADM

## 2025-01-12 RX ORDER — ACETAMINOPHEN 325 MG/1
650 TABLET ORAL EVERY 6 HOURS PRN
Status: DISCONTINUED | OUTPATIENT
Start: 2025-01-12 | End: 2025-01-17 | Stop reason: HOSPADM

## 2025-01-12 RX ORDER — ONDANSETRON 2 MG/ML
4 INJECTION INTRAMUSCULAR; INTRAVENOUS EVERY 4 HOURS PRN
Status: DISCONTINUED | OUTPATIENT
Start: 2025-01-12 | End: 2025-01-17 | Stop reason: HOSPADM

## 2025-01-12 RX ORDER — GABAPENTIN 300 MG/1
300 CAPSULE ORAL 3 TIMES DAILY
Status: DISCONTINUED | OUTPATIENT
Start: 2025-01-12 | End: 2025-01-17 | Stop reason: HOSPADM

## 2025-01-12 RX ORDER — LIDOCAINE 50 MG/G
1 OINTMENT TOPICAL 3 TIMES DAILY
Status: ON HOLD | COMMUNITY
End: 2025-01-17

## 2025-01-12 RX ORDER — TAMSULOSIN HYDROCHLORIDE 0.4 MG/1
0.4 CAPSULE ORAL DAILY
COMMUNITY

## 2025-01-12 RX ORDER — FINASTERIDE 5 MG/1
5 TABLET, FILM COATED ORAL DAILY
COMMUNITY

## 2025-01-12 RX ORDER — ASPIRIN 81 MG/1
81 TABLET ORAL DAILY
Status: DISCONTINUED | OUTPATIENT
Start: 2025-01-13 | End: 2025-01-17 | Stop reason: HOSPADM

## 2025-01-12 RX ORDER — TAMSULOSIN HYDROCHLORIDE 0.4 MG/1
0.4 CAPSULE ORAL DAILY
Status: DISCONTINUED | OUTPATIENT
Start: 2025-01-13 | End: 2025-01-17 | Stop reason: HOSPADM

## 2025-01-12 RX ORDER — ROSUVASTATIN CALCIUM 40 MG/1
40 TABLET, COATED ORAL DAILY
COMMUNITY

## 2025-01-12 RX ORDER — AMLODIPINE BESYLATE 5 MG/1
5 TABLET ORAL DAILY
Status: DISCONTINUED | OUTPATIENT
Start: 2025-01-12 | End: 2025-01-17 | Stop reason: HOSPADM

## 2025-01-12 RX ORDER — SERTRALINE HYDROCHLORIDE 100 MG/1
100 TABLET, FILM COATED ORAL NIGHTLY
Status: DISCONTINUED | OUTPATIENT
Start: 2025-01-12 | End: 2025-01-17 | Stop reason: HOSPADM

## 2025-01-12 RX ORDER — M-VIT,TX,IRON,MINS/CALC/FOLIC 27MG-0.4MG
1 TABLET ORAL DAILY
COMMUNITY

## 2025-01-12 RX ORDER — LOSARTAN POTASSIUM 50 MG/1
50 TABLET ORAL DAILY
Status: DISCONTINUED | OUTPATIENT
Start: 2025-01-12 | End: 2025-01-12

## 2025-01-12 RX ORDER — M-VIT,TX,IRON,MINS/CALC/FOLIC 27MG-0.4MG
1 TABLET ORAL DAILY
Status: DISCONTINUED | OUTPATIENT
Start: 2025-01-13 | End: 2025-01-17 | Stop reason: HOSPADM

## 2025-01-12 RX ORDER — CARVEDILOL 12.5 MG/1
12.5 TABLET ORAL 2 TIMES DAILY WITH MEALS
COMMUNITY

## 2025-01-12 RX ORDER — HYDRALAZINE HYDROCHLORIDE 20 MG/ML
10 INJECTION INTRAMUSCULAR; INTRAVENOUS EVERY 4 HOURS PRN
Status: DISCONTINUED | OUTPATIENT
Start: 2025-01-12 | End: 2025-01-17 | Stop reason: HOSPADM

## 2025-01-12 RX ORDER — ASPIRIN 81 MG/1
81 TABLET ORAL DAILY
COMMUNITY

## 2025-01-12 RX ORDER — SPIRONOLACTONE 25 MG/1
25 TABLET ORAL 2 TIMES DAILY
Status: DISCONTINUED | OUTPATIENT
Start: 2025-01-12 | End: 2025-01-17 | Stop reason: HOSPADM

## 2025-01-12 RX ORDER — LIDOCAINE HYDROCHLORIDE 20 MG/ML
20 INJECTION, SOLUTION INFILTRATION; PERINEURAL ONCE
Status: COMPLETED | OUTPATIENT
Start: 2025-01-12 | End: 2025-01-12

## 2025-01-12 RX ORDER — SPIRONOLACTONE 25 MG/1
25 TABLET ORAL 2 TIMES DAILY
COMMUNITY

## 2025-01-12 RX ORDER — ONDANSETRON 2 MG/ML
4 INJECTION INTRAMUSCULAR; INTRAVENOUS ONCE
Status: COMPLETED | OUTPATIENT
Start: 2025-01-12 | End: 2025-01-12

## 2025-01-12 RX ADMIN — MORPHINE SULFATE 4 MG: 4 INJECTION, SOLUTION INTRAMUSCULAR; INTRAVENOUS at 15:02

## 2025-01-12 RX ADMIN — IOHEXOL 100 ML: 350 INJECTION, SOLUTION INTRAVENOUS at 14:20

## 2025-01-12 RX ADMIN — LIDOCAINE HYDROCHLORIDE 20 ML: 20 INJECTION, SOLUTION INFILTRATION; PERINEURAL at 17:58

## 2025-01-12 RX ADMIN — AMLODIPINE BESYLATE 5 MG: 5 TABLET ORAL at 18:56

## 2025-01-12 RX ADMIN — POTASSIUM CHLORIDE 40 MEQ: 1500 TABLET, EXTENDED RELEASE ORAL at 18:55

## 2025-01-12 RX ADMIN — MORPHINE SULFATE 4 MG: 4 INJECTION, SOLUTION INTRAMUSCULAR; INTRAVENOUS at 12:56

## 2025-01-12 RX ADMIN — CEFAZOLIN 2 G: 2 INJECTION, POWDER, FOR SOLUTION INTRAMUSCULAR; INTRAVENOUS at 17:40

## 2025-01-12 RX ADMIN — ONDANSETRON 4 MG: 2 INJECTION INTRAMUSCULAR; INTRAVENOUS at 12:56

## 2025-01-12 RX ADMIN — CARVEDILOL 12.5 MG: 12.5 TABLET, FILM COATED ORAL at 18:56

## 2025-01-12 RX ADMIN — GABAPENTIN 300 MG: 300 CAPSULE ORAL at 18:56

## 2025-01-12 RX ADMIN — SPIRONOLACTONE 25 MG: 25 TABLET ORAL at 18:56

## 2025-01-12 RX ADMIN — SERTRALINE HYDROCHLORIDE 100 MG: 100 TABLET, FILM COATED ORAL at 21:07

## 2025-01-12 RX ADMIN — OXYCODONE HYDROCHLORIDE 10 MG: 10 TABLET ORAL at 21:07

## 2025-01-12 RX ADMIN — ENOXAPARIN SODIUM 40 MG: 100 INJECTION SUBCUTANEOUS at 18:56

## 2025-01-12 ASSESSMENT — ENCOUNTER SYMPTOMS
NAUSEA: 0
FEVER: 0
SHORTNESS OF BREATH: 0
VOMITING: 0
HEARTBURN: 0
WEIGHT LOSS: 0
CHILLS: 1
MYALGIAS: 0
SORE THROAT: 0
COUGH: 0

## 2025-01-12 ASSESSMENT — PAIN DESCRIPTION - PAIN TYPE
TYPE: ACUTE PAIN
TYPE: ACUTE PAIN

## 2025-01-12 ASSESSMENT — FIBROSIS 4 INDEX: FIB4 SCORE: 1.32

## 2025-01-12 NOTE — ED NOTES
"Medication history reviewed with patient at bedside and [facility] ([phone]).   Med rec is complete  Allergies reviewed.     Patient has not had any outpatient antibiotics in the last 30 days.   Anticoagulants: No    Patient unsure of names/ strengths of all medications, Medlist verified via [facility]- Patient states he took Sertraline and Gabapentin last night 1/11/25, but states it has been \"a few days\" since he took any other medications.    Otoniel Westfall, PhT          "

## 2025-01-12 NOTE — ED PROVIDER NOTES
"ED Provider Note    CHIEF COMPLAINT  Chief Complaint   Patient presents with    Chest Pain     R wrist pain radiates to chest         HPI/ROS  LIMITATION TO HISTORY   Select: : None  OUTSIDE HISTORIAN(S):  None.    Juan Carlos Sibley is a 64 y.o. male who presents to the emergency department for evaluation of right arm pain and chest pain.  The patient states he has severe right arm pain.  He notes that in the early morning hours his progress since that time.  It radiates from the wrist up to his arm and is described as pain and tingling.  Feels like his hand is cold.  He is a hard time moving his hand.  He is thinks he might of slept on his hand or his wrist.  States he was sleeping on a table.  He denies any other numbness or tingling or weakness to his extremities.  He does have some chest discomfort.  States the chest discomfort starts in his hand wrist and goes all the way up to his chest.  He has some shortness of breath but this is chronic from his heart failure.  Patient denies any other acute concerns or complaints but he is in significant distress because of this wrist pain.    PAST MEDICAL HISTORY   has a past medical history of History of posttraumatic stress disorder (PTSD), Hypertension, and Smoking addiction.    SURGICAL HISTORY   has a past surgical history that includes other orthopedic surgery.    FAMILY HISTORY  No family history on file.    SOCIAL HISTORY  Social History     Tobacco Use    Smoking status: Every Day     Current packs/day: 0.50     Types: Cigarettes    Smokeless tobacco: Not on file   Substance and Sexual Activity    Alcohol use: Yes     Comment: \"rarely\"    Drug use: No    Sexual activity: Not on file       CURRENT MEDICATIONS  Home Medications    **Home medications have not yet been reviewed for this encounter**       Audit from Redirected Encounters    **Home medications have not yet been reviewed for this encounter**         ALLERGIES  Allergies   Allergen Reactions    Flexeril " "[Cyclobenzaprine Hcl]      Throat swelling    Vancomycin Itching    Valproic Acid Rash     \"rash on my chest and my eyes go out of focus\"    Bee Venom        PHYSICAL EXAM  VITAL SIGNS: BP (!) 211/133   Pulse 87   Temp 36.6 °C (97.8 °F) (Temporal)   Resp 20   Ht 1.956 m (6' 5\")   Wt 98.9 kg (218 lb)   SpO2 96%   BMI 25.85 kg/m²    Constitutional: Awake alert ill-appearing.  Howling in pain.  Difficult to obtain a history from.  HENT: Normocephalic, Atraumatic,  Eyes: PERRL, EOMI, Conjunctiva normal, No discharge.   Neck: Normal range of motion,  Cardiovascular: Normal heart rate, Normal rhythm, No murmurs,  Thorax & Lungs: Normal breath sounds, No respiratory distress  Abdomen:Soft, No tenderness  Skin: Warm, Dry, No erythema, No rash.   Back: No tenderness, No CVA tenderness.   Musculoskeletal: Good range of motion in all major joints.  Upper extremity patient will not let me examine previous.  Tenderness in the wrist and fingers.  States he cannot feel his fingers.  Difficult to determine if there is cap refill in the fingers.  He does have good pulses.  Other extremities are unremarkable.  Neurologic: Alert, No focal deficits noted.   Psychiatric: Affect normal      EKG/LABS  Results for orders placed or performed during the hospital encounter of 25   EKG    Collection Time: 25 12:41 PM   Result Value Ref Range    Report       [facility] Emergency Dept.    Test Date:  2025  Pt Name:    BETZAIDA SCHAEFER                Department: ER  MRN:        [id number]                      Room:        09  Gender:     Male                         Technician: [id number]  :        1960                   Requested By:ER TRIAGE PROTOCOL  Order #:    [id number]                    Reading MD:    Measurements  Intervals                                Axis  Rate:       84                           P:          46  RI:         150                          QRS:        -45  QRSD:       121       "                    T:          102  QT:         405  QTc:        479    Interpretive Statements  Sinus rhythm  RBBB and LAFB  Probable left ventricular hypertrophy  Anterior Q waves, possibly due to LVH  Nonspecific T abnormalities, lateral leads  Artifact in lead(s) V3,V4,V5,V6  Compared to ECG 05/12/2016 20:49:47  Right bundle-branch block now present  Q waves now present  T-wave abnormality now present     CBC with Differential    Collection Time: 01/12/25 12:58 PM   Result Value Ref Range    WBC 13.3 (H) 4.8 - 10.8 K/uL    RBC 4.64 (L) 4.70 - 6.10 M/uL    Hemoglobin 14.0 14.0 - 18.0 g/dL    Hematocrit 43.1 42.0 - 52.0 %    MCV 92.9 81.4 - 97.8 fL    MCH 30.2 27.0 - 33.0 pg    MCHC 32.5 32.3 - 36.5 g/dL    RDW 46.3 35.9 - 50.0 fL    Platelet Count 314 164 - 446 K/uL    MPV 9.8 9.0 - 12.9 fL    Neutrophils-Polys 79.00 (H) 44.00 - 72.00 %    Lymphocytes 11.10 (L) 22.00 - 41.00 %    Monocytes 8.10 0.00 - 13.40 %    Eosinophils 1.00 0.00 - 6.90 %    Basophils 0.30 0.00 - 1.80 %    Immature Granulocytes 0.50 0.00 - 0.90 %    Nucleated RBC 0.00 0.00 - 0.20 /100 WBC    Neutrophils (Absolute) 10.50 (H) 1.82 - 7.42 K/uL    Lymphs (Absolute) 1.47 1.00 - 4.80 K/uL    Monos (Absolute) 1.07 (H) 0.00 - 0.85 K/uL    Eos (Absolute) 0.13 0.00 - 0.51 K/uL    Baso (Absolute) 0.04 0.00 - 0.12 K/uL    Immature Granulocytes (abs) 0.06 0.00 - 0.11 K/uL    NRBC (Absolute) 0.00 K/uL   Complete Metabolic Panel (CMP)    Collection Time: 01/12/25 12:58 PM   Result Value Ref Range    Sodium 139 135 - 145 mmol/L    Potassium 3.3 (L) 3.6 - 5.5 mmol/L    Chloride 103 96 - 112 mmol/L    Co2 27 20 - 33 mmol/L    Anion Gap 9.0 7.0 - 16.0    Glucose 81 65 - 99 mg/dL    Bun 16 8 - 22 mg/dL    Creatinine 1.08 0.50 - 1.40 mg/dL    Calcium 9.3 8.5 - 10.5 mg/dL    Correct Calcium 9.6 8.5 - 10.5 mg/dL    AST(SGOT) 23 12 - 45 U/L    ALT(SGPT) 13 2 - 50 U/L    Alkaline Phosphatase 136 (H) 30 - 99 U/L    Total Bilirubin 0.2 0.1 - 1.5 mg/dL    Albumin 3.6  3.2 - 4.9 g/dL    Total Protein 6.6 6.0 - 8.2 g/dL    Globulin 3.0 1.9 - 3.5 g/dL    A-G Ratio 1.2 g/dL   proBrain Natriuretic Peptide, NT (BNP)    Collection Time: 25 12:58 PM   Result Value Ref Range    NT-proBNP 910 (H) 0 - 125 pg/mL   Troponins NOW    Collection Time: 25 12:58 PM   Result Value Ref Range    Troponin T 19 6 - 19 ng/L   ESTIMATED GFR    Collection Time: 25 12:58 PM   Result Value Ref Range    GFR (CKD-EPI) 76 >60 mL/min/1.73 m 2   Sed Rate    Collection Time: 25 12:58 PM   Result Value Ref Range    Sed Rate Westergren 34 (H) 0 - 20 mm/hour   CRP QUANTITIVE (NON-CARDIAC)    Collection Time: 25 12:58 PM   Result Value Ref Range    Stat C-Reactive Protein 1.84 (H) 0.00 - 0.75 mg/dL   HEMOGLOBIN A1C    Collection Time: 25 12:58 PM   Result Value Ref Range    Glycohemoglobin 5.8 (H) 4.0 - 5.6 %    Est Avg Glucose 120 mg/dL   EKG (NOW)    Collection Time: 25  2:36 PM   Result Value Ref Range    Report       [facility] Emergency Dept.    Test Date:  2025  Pt Name:    BETZAIDA SCHAEFER                Department: ER  MRN:        [id number]                      Room:       [facility]  Gender:     Male                         Technician: [id number]  :        1960                   Requested By:RAVINDER POWERS  Order #:    [id number]                    Reading MD:    Measurements  Intervals                                Axis  Rate:       85                           P:          39  IL:         189                          QRS:        -36  QRSD:       123                          T:          114  QT:         385  QTc:        458    Interpretive Statements  Sinus rhythm  IVCD, consider atypical RBBB  LVH with IVCD, LAD and secondary repol abnrm  Compared to ECG 2025 12:41:05  Intraventricular conduction delay now present  Early repolarization now present  Left anterior fascicular block no longer present  Q waves no longer present  T-wave  abnormality no longer present     TROPONIN    Collection Time: 01/12/25  3:07 PM   Result Value Ref Range    Troponin T 17 6 - 19 ng/L   Lipid Profile    Collection Time: 01/12/25  3:07 PM   Result Value Ref Range    Cholesterol,Tot 154 100 - 199 mg/dL    Triglycerides 144 0 - 149 mg/dL    HDL 31 (A) >=40 mg/dL    LDL 94 <100 mg/dL   URIC ACID    Collection Time: 01/12/25  3:07 PM   Result Value Ref Range    Uric Acid 6.5 2.5 - 8.3 mg/dL   TSH WITH REFLEX TO FT4    Collection Time: 01/12/25  3:07 PM   Result Value Ref Range    TSH 1.030 0.380 - 5.330 uIU/mL   URINALYSIS CULTURE, IF INDICATED    Collection Time: 01/12/25  4:55 PM    Specimen: Urine, Clean Catch   Result Value Ref Range    Color Yellow     Character Clear     Specific Gravity 1.019 <1.035    Ph 5.5 5.0 - 8.0    Glucose Negative Negative mg/dL    Ketones Negative Negative mg/dL    Protein 30 (A) Negative mg/dL    Bilirubin Negative Negative    Urobilinogen, Urine 1.0 <=1.0 EU/dL    Nitrite Negative Negative    Leukocyte Esterase Negative Negative    Occult Blood Negative Negative    Micro Urine Req Microscopic    URINE MICROSCOPIC (W/UA)    Collection Time: 01/12/25  4:55 PM   Result Value Ref Range    WBC 0-2 /hpf    RBC 3-5 (A) 0 - 2 /hpf    Bacteria None Seen None /hpf    Epithelial Cells 0-2 0 - 5 /hpf    Mucous Threads Present /hpf    Ca Oxalate Crystal Present (A) Absent /hpf    Urine Casts 3-5 (A) 0 - 2 /lpf    Hyaline Cast Present /lpf    Sperm Present /hpf   FLUID CULTURE W/GRAM STAIN    Collection Time: 01/12/25  5:58 PM    Specimen: Other Body Fluid; Synovial   Result Value Ref Range    Significant Indicator NEG     Source SYNO     Site R wrist     Culture Result -     Gram Stain Result Many WBCs.  No organisms seen.      GRAM STAIN    Collection Time: 01/12/25  5:58 PM    Specimen: Synovial   Result Value Ref Range    Significant Indicator .     Source SYNO     Site R wrist     Gram Stain Result Many WBCs.  No organisms seen.      URINE  DRUG SCREEN    Collection Time: 01/12/25  7:02 PM   Result Value Ref Range    Amphetamines Urine Positive (A) Negative    Barbiturates Negative Negative    Benzodiazepines Negative Negative    Cocaine Metabolite Negative Negative    Fentanyl, Urine Negative Negative    Methadone Negative Negative    Opiates Negative Negative    Oxycodone Negative Negative    Phencyclidine -Pcp Negative Negative    Propoxyphene Negative Negative    Cannabinoid Metab Negative Negative   Sed Rate    Collection Time: 01/12/25  8:11 PM   Result Value Ref Range    Sed Rate Westergren 26 (H) 0 - 20 mm/hour   CRP QUANTITIVE (NON-CARDIAC)    Collection Time: 01/12/25  8:11 PM   Result Value Ref Range    Stat C-Reactive Protein 3.17 (H) 0.00 - 0.75 mg/dL   TROPONIN    Collection Time: 01/12/25  8:11 PM   Result Value Ref Range    Troponin T 20 (H) 6 - 19 ng/L      I have independently interpreted this EKG    RADIOLOGY/PROCEDURES   I have independently interpreted the diagnostic imaging associated with this visit and am waiting the final reading from the radiologist.   My preliminary interpretation is as follows: Viewed CT agree with radiologist to potation.    Radiologist interpretation:  US-RENAL   Final Result      1.  Moderate left hydronephrosis and hydroureter.   2.  Debris in the bladder lumen with a large posterior bladder diverticulum measuring 12.5 x 8.5 x 5.3 cm in diameter.   3.  Bladder volume is 590 mL.   4.  Bilateral medical renal disease.   5.  Incidentally noted is a simple cyst in the right lobe of the liver measuring 4 x 4 0.2 x 3.2 cm in diameter.      US-EXTREMITY ARTERY UPPER UNILAT RIGHT   Final Result      CT-CTA COMPLETE THORACOABDOMINAL AORTA   Final Result      1.  No thoracic or abdominal aortic aneurysm or dissection.   2.  Mild bilateral dependent atelectasis.   3.  LEFT hydronephrosis with possible stone in the distal ureter although ureters not completely imaged.  Distal LEFT ureter displaced medially by large  bladder diverticulum.                           DX-WRIST-LIMITED 2- RIGHT   Final Result      1.  No fracture or dislocation of RIGHT wrist.   2.  Moderate osteoarthritis   3.  Dorsal soft tissue swelling.      DX-CHEST-PORTABLE (1 VIEW)   Final Result      No acute cardiopulmonary disease.          COURSE & MEDICAL DECISION MAKING    ASSESSMENT, COURSE AND PLAN  Care Narrative:   64-year-old male presents to the ER with chest pain and wrist pain.  He is extremely difficult historian.  The patient is hypertensive tachycardic and having severe pain in his chest and arm and he does have a asymmetric upper extremity blood pressure readings with more than a 20 point when I measure his blood pressures.    Clinical presentation is concerning for a dissection.  Patient is seen and examined a broad dorsal diagnosis was considered including aortic dissection, PE, ACS pneumonia pneumothorax, embolic lesion or ischemia limb.  Also considered trauma, septic arthritis.    The patient returned with an x-ray which is reassuring of the chest and wrist although he does have some arthritis of the wrist.  CTAs obtained shows multiple findings but no dissection.    The right wrist is extremely tender and swollen over the dorsum of the wrist and very concerned about septic arthropathy.  It is red and hot no obvious cellulitis is exquisite pain with any movement.  This is very suggestive of septic arthropathy.  Patient is consented for an arthrocentesis.    Arthrocentesis performed by me after informed consent.      The wrist is prepped with Betadine and then ejected with lidocaine without epinephrine.  Is then prepped with a ChloraPrep and a 21-gauge needle is inserted about 1 and half cc of fluid is obtained.  This is grossly purulent.  The patient started on Ancef.    The patient has a questionable kidney stone.  No signs of UTI.  He received a dose of Ancef this can be worked up as an inpatient.  His main complaint is his right  wrist.    I cannot exclude a vascular etiology like a distal embolic phenomenon so vascular was consulted.  They felt that they had good flow.  Considering I think is more localized to his wrist after multiple doses of pain meds and is unlikely to be embolic.  His cap refill has improved as his mental status is improved he is much more example.    Spoke with Dr. Caldwell from orthopedic surgery.  He wanted me to do the arthrocentesis which I have done and he will see the patient tomorrow for possible I&D.    Patient is agreeable to plan.  Discussed case the hospitalist team and care transferred at that time.        Additional problems  Homelessness  Methamphetamine use  Incidental ureteral stone  Type II arthritis of right wrist.    DISPOSITION AND DISCUSSIONS  I have discussed management of the patient with the following physicians and KOKI's: Orthopedic surgery and the hospitalist.        Barriers to care at this time, including but not limited to: Patient does not have established PCP, Patient is homeless, Patient lacks transportation , and Patient does not have insurance.     Decision tools and prescription drugs considered including, but not limited to: Biotics initiated for septic arthritis..    FINAL DIAGNOSIS  1. Other chest pain    2. Right wrist pain    3. Ureteral stone    4. Pyogenic arthritis of right wrist, due to unspecified organism (HCC)    5.  Arthrocentesis of right wrist.     Electronically signed by: Trey Chiu M.D., 1/12/2025 1:14 PM

## 2025-01-12 NOTE — ED NOTES
"Patient complaining of persisting 9/10 \"burning\" right wrist pain that radiates to his chest and neck. Dr. Chiu notified. Patient medicated per MAR. Phlebotomy at bedside.   "

## 2025-01-12 NOTE — ED TRIAGE NOTES
"Chief Complaint   Patient presents with    Chest Pain     R wrist pain radiates to chest     Patient BIB [facility] for above. Patient states he woke up this morning with 9/10 right wrist pain radiating to his chest. Pain described as \"burning.\" Denied any recent falls or trauma. Patient says he's had diarrhea for approximately 4 days. Denied any NV, abdominal pain, shortness of breath, fevers, chills.     Per EMS, patient had an MI approximately 20 years ago and was recently diagnosed with heart failure, but has been noncompliant with his medications. Patient admits to smoking methamphetamine use approximately 1 hour PTA, but denied any IV drug use.     Patient received 324mg Aspirin by EMS.    BP (!) 211/133   Pulse 87   Temp 36.6 °C (97.8 °F) (Temporal)   Resp 20   Ht 1.956 m (6' 5\")   Wt 98.9 kg (218 lb)   SpO2 96%   BMI 25.85 kg/m²     "

## 2025-01-12 NOTE — ED NOTES
Spoke with CT tech who said patient's IV was removed. PIV replaced. CT notified of new IV placement.

## 2025-01-13 ENCOUNTER — ANESTHESIA EVENT (OUTPATIENT)
Dept: SURGERY | Facility: MEDICAL CENTER | Age: 65
DRG: 513 | End: 2025-01-13
Payer: COMMERCIAL

## 2025-01-13 ENCOUNTER — ANESTHESIA (OUTPATIENT)
Dept: SURGERY | Facility: MEDICAL CENTER | Age: 65
DRG: 513 | End: 2025-01-13
Payer: COMMERCIAL

## 2025-01-13 LAB
ALBUMIN SERPL BCP-MCNC: 3.2 G/DL (ref 3.2–4.9)
ALBUMIN/GLOB SERPL: 1.2 G/DL
ALP SERPL-CCNC: 117 U/L (ref 30–99)
ALT SERPL-CCNC: 10 U/L (ref 2–50)
ANION GAP SERPL CALC-SCNC: 9 MMOL/L (ref 7–16)
AST SERPL-CCNC: 20 U/L (ref 12–45)
BILIRUB SERPL-MCNC: 0.2 MG/DL (ref 0.1–1.5)
BUN SERPL-MCNC: 16 MG/DL (ref 8–22)
CALCIUM ALBUM COR SERPL-MCNC: 9.2 MG/DL (ref 8.5–10.5)
CALCIUM SERPL-MCNC: 8.6 MG/DL (ref 8.5–10.5)
CHLORIDE SERPL-SCNC: 103 MMOL/L (ref 96–112)
CO2 SERPL-SCNC: 25 MMOL/L (ref 20–33)
CREAT SERPL-MCNC: 1.08 MG/DL (ref 0.5–1.4)
ERYTHROCYTE [DISTWIDTH] IN BLOOD BY AUTOMATED COUNT: 46.5 FL (ref 35.9–50)
GFR SERPLBLD CREATININE-BSD FMLA CKD-EPI: 76 ML/MIN/1.73 M 2
GLOBULIN SER CALC-MCNC: 2.7 G/DL (ref 1.9–3.5)
GLUCOSE SERPL-MCNC: 118 MG/DL (ref 65–99)
GRAM STN SPEC: NORMAL
HCT VFR BLD AUTO: 38.3 % (ref 42–52)
HGB BLD-MCNC: 12.7 G/DL (ref 14–18)
MCH RBC QN AUTO: 30.5 PG (ref 27–33)
MCHC RBC AUTO-ENTMCNC: 33.2 G/DL (ref 32.3–36.5)
MCV RBC AUTO: 91.8 FL (ref 81.4–97.8)
PLATELET # BLD AUTO: 260 K/UL (ref 164–446)
PMV BLD AUTO: 10.2 FL (ref 9–12.9)
POTASSIUM SERPL-SCNC: 4.4 MMOL/L (ref 3.6–5.5)
PROT SERPL-MCNC: 5.9 G/DL (ref 6–8.2)
RBC # BLD AUTO: 4.17 M/UL (ref 4.7–6.1)
SIGNIFICANT IND 70042: NORMAL
SITE SITE: NORMAL
SODIUM SERPL-SCNC: 137 MMOL/L (ref 135–145)
SOURCE SOURCE: NORMAL
WBC # BLD AUTO: 7.7 K/UL (ref 4.8–10.8)

## 2025-01-13 PROCEDURE — 700101 HCHG RX REV CODE 250: Performed by: ANESTHESIOLOGY

## 2025-01-13 PROCEDURE — 88342 IMHCHEM/IMCYTCHM 1ST ANTB: CPT | Performed by: PATHOLOGY

## 2025-01-13 PROCEDURE — 160002 HCHG RECOVERY MINUTES (STAT): Performed by: ORTHOPAEDIC SURGERY

## 2025-01-13 PROCEDURE — 700105 HCHG RX REV CODE 258: Performed by: STUDENT IN AN ORGANIZED HEALTH CARE EDUCATION/TRAINING PROGRAM

## 2025-01-13 PROCEDURE — A9270 NON-COVERED ITEM OR SERVICE: HCPCS

## 2025-01-13 PROCEDURE — 700102 HCHG RX REV CODE 250 W/ 637 OVERRIDE(OP): Performed by: ANESTHESIOLOGY

## 2025-01-13 PROCEDURE — 160035 HCHG PACU - 1ST 60 MINS PHASE I: Performed by: ORTHOPAEDIC SURGERY

## 2025-01-13 PROCEDURE — 700111 HCHG RX REV CODE 636 W/ 250 OVERRIDE (IP): Mod: JZ | Performed by: ANESTHESIOLOGY

## 2025-01-13 PROCEDURE — 97162 PT EVAL MOD COMPLEX 30 MIN: CPT

## 2025-01-13 PROCEDURE — A9270 NON-COVERED ITEM OR SERVICE: HCPCS | Performed by: STUDENT IN AN ORGANIZED HEALTH CARE EDUCATION/TRAINING PROGRAM

## 2025-01-13 PROCEDURE — A9270 NON-COVERED ITEM OR SERVICE: HCPCS | Performed by: ANESTHESIOLOGY

## 2025-01-13 PROCEDURE — 36415 COLL VENOUS BLD VENIPUNCTURE: CPT

## 2025-01-13 PROCEDURE — 88305 TISSUE EXAM BY PATHOLOGIST: CPT | Mod: 26 | Performed by: PATHOLOGY

## 2025-01-13 PROCEDURE — 160038 HCHG SURGERY MINUTES - EA ADDL 1 MIN LEVEL 2: Performed by: ORTHOPAEDIC SURGERY

## 2025-01-13 PROCEDURE — 700102 HCHG RX REV CODE 250 W/ 637 OVERRIDE(OP)

## 2025-01-13 PROCEDURE — 99233 SBSQ HOSP IP/OBS HIGH 50: CPT | Performed by: STUDENT IN AN ORGANIZED HEALTH CARE EDUCATION/TRAINING PROGRAM

## 2025-01-13 PROCEDURE — 700111 HCHG RX REV CODE 636 W/ 250 OVERRIDE (IP): Performed by: STUDENT IN AN ORGANIZED HEALTH CARE EDUCATION/TRAINING PROGRAM

## 2025-01-13 PROCEDURE — 80053 COMPREHEN METABOLIC PANEL: CPT

## 2025-01-13 PROCEDURE — 700102 HCHG RX REV CODE 250 W/ 637 OVERRIDE(OP): Performed by: STUDENT IN AN ORGANIZED HEALTH CARE EDUCATION/TRAINING PROGRAM

## 2025-01-13 PROCEDURE — 700105 HCHG RX REV CODE 258: Performed by: ANESTHESIOLOGY

## 2025-01-13 PROCEDURE — 85027 COMPLETE CBC AUTOMATED: CPT

## 2025-01-13 PROCEDURE — 99222 1ST HOSP IP/OBS MODERATE 55: CPT | Mod: 57 | Performed by: ORTHOPAEDIC SURGERY

## 2025-01-13 PROCEDURE — 770001 HCHG ROOM/CARE - MED/SURG/GYN PRIV*

## 2025-01-13 PROCEDURE — 25040 ARTHRT RDCRPL/MIDCARPL JT: CPT | Mod: RT | Performed by: ORTHOPAEDIC SURGERY

## 2025-01-13 PROCEDURE — 88341 IMHCHEM/IMCYTCHM EA ADD ANTB: CPT | Mod: 26 | Performed by: PATHOLOGY

## 2025-01-13 PROCEDURE — 88305 TISSUE EXAM BY PATHOLOGIST: CPT | Performed by: PATHOLOGY

## 2025-01-13 PROCEDURE — 700111 HCHG RX REV CODE 636 W/ 250 OVERRIDE (IP): Performed by: ANESTHESIOLOGY

## 2025-01-13 PROCEDURE — 88341 IMHCHEM/IMCYTCHM EA ADD ANTB: CPT | Mod: 91 | Performed by: PATHOLOGY

## 2025-01-13 PROCEDURE — 87075 CULTR BACTERIA EXCEPT BLOOD: CPT

## 2025-01-13 PROCEDURE — 87070 CULTURE OTHR SPECIMN AEROBIC: CPT

## 2025-01-13 PROCEDURE — 87205 SMEAR GRAM STAIN: CPT

## 2025-01-13 PROCEDURE — 0RBN0ZZ EXCISION OF RIGHT WRIST JOINT, OPEN APPROACH: ICD-10-PCS | Performed by: ORTHOPAEDIC SURGERY

## 2025-01-13 PROCEDURE — 160036 HCHG PACU - EA ADDL 30 MINS PHASE I: Performed by: ORTHOPAEDIC SURGERY

## 2025-01-13 PROCEDURE — 160027 HCHG SURGERY MINUTES - 1ST 30 MINS LEVEL 2: Performed by: ORTHOPAEDIC SURGERY

## 2025-01-13 PROCEDURE — 160009 HCHG ANES TIME/MIN: Performed by: ORTHOPAEDIC SURGERY

## 2025-01-13 PROCEDURE — 160048 HCHG OR STATISTICAL LEVEL 1-5: Performed by: ORTHOPAEDIC SURGERY

## 2025-01-13 PROCEDURE — 88342 IMHCHEM/IMCYTCHM 1ST ANTB: CPT | Mod: 26 | Performed by: PATHOLOGY

## 2025-01-13 RX ORDER — LIDOCAINE HYDROCHLORIDE 20 MG/ML
INJECTION, SOLUTION EPIDURAL; INFILTRATION; INTRACAUDAL; PERINEURAL PRN
Status: DISCONTINUED | OUTPATIENT
Start: 2025-01-13 | End: 2025-01-13 | Stop reason: SURG

## 2025-01-13 RX ORDER — MORPHINE SULFATE 4 MG/ML
1 INJECTION INTRAVENOUS
Status: DISCONTINUED | OUTPATIENT
Start: 2025-01-13 | End: 2025-01-13 | Stop reason: HOSPADM

## 2025-01-13 RX ORDER — SODIUM CHLORIDE, SODIUM LACTATE, POTASSIUM CHLORIDE, CALCIUM CHLORIDE 600; 310; 30; 20 MG/100ML; MG/100ML; MG/100ML; MG/100ML
INJECTION, SOLUTION INTRAVENOUS
Status: DISCONTINUED | OUTPATIENT
Start: 2025-01-13 | End: 2025-01-13 | Stop reason: SURG

## 2025-01-13 RX ORDER — OXYCODONE HCL 5 MG/5 ML
10 SOLUTION, ORAL ORAL
Status: COMPLETED | OUTPATIENT
Start: 2025-01-13 | End: 2025-01-13

## 2025-01-13 RX ORDER — HALOPERIDOL 5 MG/ML
1 INJECTION INTRAMUSCULAR
Status: DISCONTINUED | OUTPATIENT
Start: 2025-01-13 | End: 2025-01-13 | Stop reason: HOSPADM

## 2025-01-13 RX ORDER — SODIUM CHLORIDE, SODIUM LACTATE, POTASSIUM CHLORIDE, CALCIUM CHLORIDE 600; 310; 30; 20 MG/100ML; MG/100ML; MG/100ML; MG/100ML
INJECTION, SOLUTION INTRAVENOUS CONTINUOUS
Status: DISCONTINUED | OUTPATIENT
Start: 2025-01-13 | End: 2025-01-13 | Stop reason: HOSPADM

## 2025-01-13 RX ORDER — EPINEPHRINE 1 MG/ML(1)
AMPUL (ML) INJECTION PRN
Status: DISCONTINUED | OUTPATIENT
Start: 2025-01-13 | End: 2025-01-13 | Stop reason: SURG

## 2025-01-13 RX ORDER — IPRATROPIUM BROMIDE AND ALBUTEROL SULFATE 2.5; .5 MG/3ML; MG/3ML
3 SOLUTION RESPIRATORY (INHALATION)
Status: DISCONTINUED | OUTPATIENT
Start: 2025-01-13 | End: 2025-01-13 | Stop reason: HOSPADM

## 2025-01-13 RX ORDER — HYDRALAZINE HYDROCHLORIDE 20 MG/ML
5 INJECTION INTRAMUSCULAR; INTRAVENOUS
Status: DISCONTINUED | OUTPATIENT
Start: 2025-01-13 | End: 2025-01-13 | Stop reason: HOSPADM

## 2025-01-13 RX ORDER — ONDANSETRON 2 MG/ML
4 INJECTION INTRAMUSCULAR; INTRAVENOUS
Status: DISCONTINUED | OUTPATIENT
Start: 2025-01-13 | End: 2025-01-13 | Stop reason: HOSPADM

## 2025-01-13 RX ORDER — ONDANSETRON 2 MG/ML
INJECTION INTRAMUSCULAR; INTRAVENOUS PRN
Status: DISCONTINUED | OUTPATIENT
Start: 2025-01-13 | End: 2025-01-13 | Stop reason: SURG

## 2025-01-13 RX ORDER — OXYCODONE HCL 5 MG/5 ML
5 SOLUTION, ORAL ORAL
Status: COMPLETED | OUTPATIENT
Start: 2025-01-13 | End: 2025-01-13

## 2025-01-13 RX ORDER — DIPHENHYDRAMINE HYDROCHLORIDE 50 MG/ML
12.5 INJECTION INTRAMUSCULAR; INTRAVENOUS
Status: DISCONTINUED | OUTPATIENT
Start: 2025-01-13 | End: 2025-01-13 | Stop reason: HOSPADM

## 2025-01-13 RX ORDER — MORPHINE SULFATE 4 MG/ML
2 INJECTION INTRAVENOUS
Status: DISCONTINUED | OUTPATIENT
Start: 2025-01-13 | End: 2025-01-13 | Stop reason: HOSPADM

## 2025-01-13 RX ADMIN — GABAPENTIN 300 MG: 300 CAPSULE ORAL at 12:02

## 2025-01-13 RX ADMIN — Medication 1 TABLET: at 06:15

## 2025-01-13 RX ADMIN — SACUBITRIL AND VALSARTAN 1 TABLET: 49; 51 TABLET, FILM COATED ORAL at 06:15

## 2025-01-13 RX ADMIN — CARVEDILOL 12.5 MG: 12.5 TABLET, FILM COATED ORAL at 07:30

## 2025-01-13 RX ADMIN — PROPOFOL 100 MG: 10 INJECTION, EMULSION INTRAVENOUS at 15:40

## 2025-01-13 RX ADMIN — EPINEPHRINE 25 MCG: 1 INJECTION INTRAMUSCULAR; INTRAVENOUS; SUBCUTANEOUS at 15:40

## 2025-01-13 RX ADMIN — FENTANYL CITRATE 50 MCG: 50 INJECTION, SOLUTION INTRAMUSCULAR; INTRAVENOUS at 15:39

## 2025-01-13 RX ADMIN — ASPIRIN 81 MG: 81 TABLET, COATED ORAL at 06:15

## 2025-01-13 RX ADMIN — SERTRALINE HYDROCHLORIDE 100 MG: 100 TABLET, FILM COATED ORAL at 21:03

## 2025-01-13 RX ADMIN — ROSUVASTATIN CALCIUM 40 MG: 20 TABLET, FILM COATED ORAL at 06:14

## 2025-01-13 RX ADMIN — FENTANYL CITRATE 50 MCG: 50 INJECTION, SOLUTION INTRAMUSCULAR; INTRAVENOUS at 15:57

## 2025-01-13 RX ADMIN — LIDOCAINE HYDROCHLORIDE 60 MG: 20 INJECTION, SOLUTION EPIDURAL; INFILTRATION; INTRACAUDAL; PERINEURAL at 15:40

## 2025-01-13 RX ADMIN — TAMSULOSIN HYDROCHLORIDE 0.4 MG: 0.4 CAPSULE ORAL at 06:15

## 2025-01-13 RX ADMIN — CEFAZOLIN 2 G: 2 INJECTION, POWDER, FOR SOLUTION INTRAMUSCULAR; INTRAVENOUS at 00:27

## 2025-01-13 RX ADMIN — FENTANYL CITRATE 50 MCG: 50 INJECTION, SOLUTION INTRAMUSCULAR; INTRAVENOUS at 16:46

## 2025-01-13 RX ADMIN — SPIRONOLACTONE 25 MG: 25 TABLET ORAL at 06:15

## 2025-01-13 RX ADMIN — SODIUM CHLORIDE, POTASSIUM CHLORIDE, SODIUM LACTATE AND CALCIUM CHLORIDE: 600; 310; 30; 20 INJECTION, SOLUTION INTRAVENOUS at 15:30

## 2025-01-13 RX ADMIN — CEFAZOLIN 2 G: 2 INJECTION, POWDER, FOR SOLUTION INTRAMUSCULAR; INTRAVENOUS at 15:40

## 2025-01-13 RX ADMIN — CEFAZOLIN 2 G: 2 INJECTION, POWDER, FOR SOLUTION INTRAMUSCULAR; INTRAVENOUS at 21:06

## 2025-01-13 RX ADMIN — GABAPENTIN 300 MG: 300 CAPSULE ORAL at 06:15

## 2025-01-13 RX ADMIN — OXYCODONE HYDROCHLORIDE 10 MG: 5 SOLUTION ORAL at 16:46

## 2025-01-13 RX ADMIN — OXYCODONE 5 MG: 5 TABLET ORAL at 21:03

## 2025-01-13 RX ADMIN — PROPOFOL 30 MG: 10 INJECTION, EMULSION INTRAVENOUS at 15:57

## 2025-01-13 RX ADMIN — EPINEPHRINE 25 MCG: 1 INJECTION INTRAMUSCULAR; INTRAVENOUS; SUBCUTANEOUS at 15:52

## 2025-01-13 RX ADMIN — FINASTERIDE 5 MG: 5 TABLET, FILM COATED ORAL at 06:15

## 2025-01-13 RX ADMIN — OXYCODONE HYDROCHLORIDE 10 MG: 10 TABLET ORAL at 06:16

## 2025-01-13 RX ADMIN — CEFAZOLIN 2 G: 2 INJECTION, POWDER, FOR SOLUTION INTRAMUSCULAR; INTRAVENOUS at 06:17

## 2025-01-13 RX ADMIN — ONDANSETRON 4 MG: 2 INJECTION INTRAMUSCULAR; INTRAVENOUS at 16:06

## 2025-01-13 RX ADMIN — FENTANYL CITRATE 25 MCG: 50 INJECTION, SOLUTION INTRAMUSCULAR; INTRAVENOUS at 17:00

## 2025-01-13 ASSESSMENT — COGNITIVE AND FUNCTIONAL STATUS - GENERAL
PERSONAL GROOMING: A LITTLE
DAILY ACTIVITIY SCORE: 18
SUGGESTED CMS G CODE MODIFIER MOBILITY: CK
MOVING FROM LYING ON BACK TO SITTING ON SIDE OF FLAT BED: A LITTLE
MOVING TO AND FROM BED TO CHAIR: A LITTLE
WALKING IN HOSPITAL ROOM: A LOT
DRESSING REGULAR LOWER BODY CLOTHING: A LITTLE
TOILETING: A LITTLE
WALKING IN HOSPITAL ROOM: A LOT
EATING MEALS: A LITTLE
HELP NEEDED FOR BATHING: A LITTLE
STANDING UP FROM CHAIR USING ARMS: A LITTLE
SUGGESTED CMS G CODE MODIFIER MOBILITY: CJ
DRESSING REGULAR UPPER BODY CLOTHING: A LITTLE
CLIMB 3 TO 5 STEPS WITH RAILING: A LOT
MOBILITY SCORE: 17
MOBILITY SCORE: 20
CLIMB 3 TO 5 STEPS WITH RAILING: A LOT
SUGGESTED CMS G CODE MODIFIER DAILY ACTIVITY: CK

## 2025-01-13 ASSESSMENT — LIFESTYLE VARIABLES
ON A TYPICAL DAY WHEN YOU DRINK ALCOHOL HOW MANY DRINKS DO YOU HAVE: 0
HAVE PEOPLE ANNOYED YOU BY CRITICIZING YOUR DRINKING: NO
AVERAGE NUMBER OF DAYS PER WEEK YOU HAVE A DRINK CONTAINING ALCOHOL: 0
HOW MANY TIMES IN THE PAST YEAR HAVE YOU HAD 5 OR MORE DRINKS IN A DAY: 0
DOES PATIENT WANT TO STOP DRINKING: NO
TOTAL SCORE: 0
ALCOHOL_USE: NO
CONSUMPTION TOTAL: NEGATIVE
TOTAL SCORE: 0
TOTAL SCORE: 0
HAVE YOU EVER FELT YOU SHOULD CUT DOWN ON YOUR DRINKING: NO
EVER FELT BAD OR GUILTY ABOUT YOUR DRINKING: NO
EVER HAD A DRINK FIRST THING IN THE MORNING TO STEADY YOUR NERVES TO GET RID OF A HANGOVER: NO

## 2025-01-13 ASSESSMENT — PAIN DESCRIPTION - PAIN TYPE
TYPE: SURGICAL PAIN
TYPE: ACUTE PAIN
TYPE: SURGICAL PAIN
TYPE: ACUTE PAIN
TYPE: SURGICAL PAIN
TYPE: SURGICAL PAIN

## 2025-01-13 ASSESSMENT — PAIN SCALES - GENERAL: PAIN_LEVEL: 3

## 2025-01-13 ASSESSMENT — GAIT ASSESSMENTS: GAIT LEVEL OF ASSIST: UNABLE TO PARTICIPATE

## 2025-01-13 ASSESSMENT — ENCOUNTER SYMPTOMS
COUGH: 0
FEVER: 0

## 2025-01-13 ASSESSMENT — FIBROSIS 4 INDEX: FIB4 SCORE: 1.581138830084189666

## 2025-01-13 NOTE — CONSULTS
1/13/2025    HPI: Juan Carlos Sibley is a 64 y.o. male who presents with right wrist pain and concern for right wrist septic arthritis.  Currently complains of right wrist pain and inability to move the wrist.  Denies numbness or tingling in his fingers.    Past Medical History:   Diagnosis Date    History of posttraumatic stress disorder (PTSD)     Hypertension     not taking meds    Smoking addiction        Past Surgical History:   Procedure Laterality Date    OTHER ORTHOPEDIC SURGERY      reports chronic back and neck pain       Medications  No current facility-administered medications on file prior to encounter.     Current Outpatient Medications on File Prior to Encounter   Medication Sig Dispense Refill    gabapentin (NEURONTIN) 300 MG Cap Take 1 Cap by mouth 3 times a day. 90 Cap 2    sertraline (ZOLOFT) 100 MG Tab Take 100 mg by mouth every day.      amLODIPine (NORVASC) 5 MG Tab Take 5 mg by mouth every day.      losartan (COZAAR) 50 MG Tab Take 50 mg by mouth every day.      spironolactone (ALDACTONE) 25 MG Tab Take 25 mg by mouth 2 times a day.      aspirin 81 MG EC tablet Take 81 mg by mouth every day.      tamsulosin (FLOMAX) 0.4 MG capsule Take 0.4 mg by mouth every day.      finasteride (PROSCAR) 5 MG Tab Take 5 mg by mouth every day.      therapeutic multivitamin-minerals (THERAGRAN-M) Tab Take 1 Tablet by mouth every day.      sacubitril-valsartan (ENTRESTO) 49-51 MG Tab Take 1 Tablet by mouth 2 times a day.      rosuvastatin (CRESTOR) 40 MG tablet Take 40 mg by mouth every day.      lidocaine (XYLOCAINE) 5 % Ointment Apply 1 Each topically 3 times a day.      carvedilol (COREG) 12.5 MG Tab Take 12.5 mg by mouth 2 times a day with meals.         Allergies  Flexeril [cyclobenzaprine hcl], Lisinopril, Terazosin, Valproic acid, Vancomycin, and Bee venom    ROS  Negative except as indicated in the HPI    No family history on file.    Social History     Socioeconomic History    Marital status: Single  "  Tobacco Use    Smoking status: Every Day     Current packs/day: 0.50     Types: Cigarettes   Substance and Sexual Activity    Alcohol use: Yes     Comment: \"rarely\"    Drug use: No       Physical Exam  Vitals  BP (!) 147/91   Pulse 65   Temp 36.4 °C (97.5 °F) (Temporal)   Resp 18   Ht 1.956 m (6' 5\")   Wt 92.4 kg (203 lb 11.3 oz)   SpO2 95%   General: NAD  HEENT: Normocephalic, atraumatic  Psych: Normal mood and affect  Neck: No collar in place, normal appearing motion  Lungs: No increased work of breathing  Heart: Regular rate by palpation of peripheral pulse  Abdomen: Nondistended  MSK:   On inspection right wrist there is diffuse swelling about the dorsum of the wrist.  Does not tolerate more than about a 15 degree arc of passive motion or active motion of the wrist without pain.  Neurovascular intact distally.      Radiographs:  X-ray right wrist demonstrates no acute fracture, subluxation, or dislocation.    US-RENAL   Final Result      1.  Moderate left hydronephrosis and hydroureter.   2.  Debris in the bladder lumen with a large posterior bladder diverticulum measuring 12.5 x 8.5 x 5.3 cm in diameter.   3.  Bladder volume is 590 mL.   4.  Bilateral medical renal disease.   5.  Incidentally noted is a simple cyst in the right lobe of the liver measuring 4 x 4 0.2 x 3.2 cm in diameter.      US-EXTREMITY ARTERY UPPER UNILAT RIGHT   Final Result      CT-CTA COMPLETE THORACOABDOMINAL AORTA   Final Result      1.  No thoracic or abdominal aortic aneurysm or dissection.   2.  Mild bilateral dependent atelectasis.   3.  LEFT hydronephrosis with possible stone in the distal ureter although ureters not completely imaged.  Distal LEFT ureter displaced medially by large bladder diverticulum.                           DX-WRIST-LIMITED 2- RIGHT   Final Result      1.  No fracture or dislocation of RIGHT wrist.   2.  Moderate osteoarthritis   3.  Dorsal soft tissue swelling.      DX-CHEST-PORTABLE (1 VIEW)   Final " Result      No acute cardiopulmonary disease.          Laboratory Values  Recent Labs     01/12/25  1258 01/13/25  0105   WBC 13.3* 7.7   RBC 4.64* 4.17*   HEMOGLOBIN 14.0 12.7*   HEMATOCRIT 43.1 38.3*   MCV 92.9 91.8   MCH 30.2 30.5   MCHC 32.5 33.2   RDW 46.3 46.5   PLATELETCT 314 260   MPV 9.8 10.2     Recent Labs     01/12/25  1258 01/13/25  0105   SODIUM 139 137   POTASSIUM 3.3* 4.4   CHLORIDE 103 103   CO2 27 25   GLUCOSE 81 118*   BUN 16 16             Assessment: 64-year-old male with possible right wrist septic arthritis    Plan: We discussed the diagnosis and findings with the patient at length.  We reviewed possible non operative and operative interventions and the risks and benefits of each of these.  he had a chance to ask questions and all of these were answered to his satisfaction. The patient chose to proceed with operative intervention to include incision and drainage right wrist. Risks and benefits of surgery were discussed which include but are not limited to bleeding, infection, neurovascular damage, malunion, nonunion, instability, limb length discrepancy, DVT, PE, MI, Stroke and death. They understand these risks and wish to proceed.      I spoke with the emergency department who aspirated the wrist and described the aspirate as gross purulence.  There was not enough fluid apparently to perform a cell count or crystal analysis.  The Gram stain demonstrates many white blood cells but no organisms.  Culture is negative to date.  Patient feels that he is improving on antibiotics.  I discussed with him that this could be infection versus crystal arthropathy versus inflammatory condition.  He would like to proceed with incision and drainage for possible infection.  I think this is reasonable.  NPO for OR      Hugh Caldwell MD  Orthopedic Trauma

## 2025-01-13 NOTE — ASSESSMENT & PLAN NOTE
Report intermittently uses meth  Last use was 24 hours ago prior to admission  Patient states that he has been smoking meth  Urine positive for amphetamine

## 2025-01-13 NOTE — ED NOTES
Break RN:     ERP at bedside, lidocaine provided. ERP numbed pt's R wrist, aspiration performed and collected.

## 2025-01-13 NOTE — THERAPY
Physical Therapy   Initial Evaluation     Patient Name: Juan Carlos Sibley  Age:  64 y.o., Sex:  male  Medical Record #: [id number]  Today's Date: 1/13/2025          Assessment  Patient is 64 y.o. male w/ hx of HTN, PTSD, meth.  Meth positive on admit.  C/o right hand pain/edema, negative for DVT.  Pending I&D later today.  He is homeless and relies on his w/c for mobility w/ very limited ambulation prior.  He is able to demonstrate a bed to/from w/c transfer today to/from his own w/c.  He was unable to grasp a fww or the handles of his w/c in order to attempt ambulation.  He appears to be at or close to his PLOF.  He can propel the w/c w/ his LUE and bilateral feet. No PT needs for mobility.    Plan    Physical Therapy Initial Treatment Plan   Duration: Evaluation only    DC Equipment Recommendations: None  Discharge Recommendations: Anticipate that the patient will have no further physical therapy needs after discharge from the hospital       Objective       01/13/25 0915   Prior Living Situation   Housing / Facility Homeless   Prior Level of Functional Mobility   Bed Mobility Independent   Transfer Status Independent   Ambulation Independent   Assistive Devices Used Wheelchair   Wheelchair Independent   Sensation Lower Body   Comments grossly wnl   Balance Assessment   Sitting Balance (Static) Fair +   Sitting Balance (Dynamic) Fair +   Standing Balance (Static) Fair   Standing Balance (Dynamic) Fair   Weight Shift Sitting Good   Weight Shift Standing Good   Bed Mobility    Supine to Sit Supervised   Sit to Supine Supervised   Gait Analysis   Gait Level Of Assist Unable to Participate   Comments unable to grasp w/c handles in order to ambulate   Functional Mobility   Sit to Stand Supervised   Bed, Chair, Wheelchair Transfer Supervised   Physical Therapy Initial Treatment Plan    Duration Evaluation only   Anticipated Discharge Equipment and Recommendations   DC Equipment Recommendations None   Discharge Recommendations  Anticipate that the patient will have no further physical therapy needs after discharge from the hospital

## 2025-01-13 NOTE — ASSESSMENT & PLAN NOTE
Complaining of right wrist pain with swelling for the past 2 days  Associated with chills, and pain radiates to the elbow and right jaw  Status post arthrocentesis in ED  Plain film showed osteoarthritis and dorsum soft tissue swelling  US venous with DVT  Check uric acid   Received IV Ancef  Orthopedic has been consulted and will see patient  Follow-up on the cultures  Continue IV and biotics

## 2025-01-13 NOTE — DOCUMENTATION QUERY
[facility]                                                                       Query Response Note      PATIENT:               BETZAIDA SCHAEFER  ACCT #:                  [id number]  MRN:                     [id number]  :                      1960  ADMIT DATE:       2025 12:31 PM  DISCH DATE:          RESPONDING  PROVIDER #:        [id number]           QUERY TEXT:    Acute hypoxic respiratory failure is documented in the H&P.  Also per H&P, pt with normal pulmonary effort and negative for shortness of breath.   After further study, can the diagnosis of acute respiratory failure be clarified with supportive clinical indicators?    The patient's Clinical Indicators include:  H&P:  Acute hypoxic respiratory failure: CTA showed bilateral dependent atelectasis.  , pt appears euvolemic.  CXR with no acute pathology.    Pulmonary:  pulmonary effort is normal.  Normal breath sounds.   Respiratory:  negative for cough and shortness of breath   SpO2 88% on RA, 91% on 2L, RR 20  Risk factors: atelectasis  Treatment: supplemental O2, IS    Important Note:  if new diagnosis or change to documentation made via query please include in daily documentation and/or DC Summary    Thank you,  Pooja Petty RN, BSN, CCDS  Clinical   Connect via [employer]  Options provided:   -- Acute hypoxic respiratory failure confirmed (please clarify supportive clinical indicators), (please document additional clinical indicators)   -- Acute respiratory failure is ruled out, hypoxia is ruled in   -- Other explanation, (please specify other explanation)      Query created by: Pooja Petty on 2025 12:28 PM    RESPONSE TEXT:    Acute hypoxic respiratory failure confirmed atelectasis          Electronically signed by:  ANNALISA CASTELAN MD 2025 2:53 PM

## 2025-01-13 NOTE — DISCHARGE PLANNING
"Care Transition Team Assessment  LMSW met with pt at bedside to conduct assessment and verify demographics. Pt was very lethargic while responding to assessment questions.  Pt was admitted on 1/12/25 for Right wrist pain. Please see pt's H&P for prior medical history.     [facility].     Pt stated that he has been living at shelters and also stays at hotel rooms when he has the money for it. Pt [facility] . LMSW asked pt to clarify if [facility], pt did not clarify.     Pt verified daughter as emergency contact but did not know her number.     Pt was modified independent with ALDS and IADLS with the use of a WC. Pt states that he utilizes RTC transport to get around as long as he has \"money for it\".     Pt stated he received SSDI of $2,219 a month.     Pt verified insurance of [facility] and Medicare.     Upon DC pt will need assistance with transportation.     Information Source  Information Given By: Patient  Who is responsible for making decisions for patient? : Patient    Readmission Evaluation  Is this a readmission?: No    Elopement Risk  Legal Hold: No  Ambulatory or Self Mobile in Wheelchair: No-Not an Elopement Risk  Elopement Risk: Not at Risk for Elopement    Discharge Preparedness  What is your plan after discharge?: Uncertain - pending medical team collaboration  Prior Functional Level: Ambulatory, Independent with Activities of Daily Living, Uses Wheelchair  Difficulity with ADLs: Walking  Difficulity with IADLs: None    Functional Assesment  Prior Functional Level: Ambulatory, Independent with Activities of Daily Living, Uses Wheelchair    Finances  Financial Barriers to Discharge: Yes  Source of Income: Social Security Disability  Prescription Coverage: Yes    Advance Directive  Advance Directive?: None    Domestic Abuse  Have you ever been the victim of abuse or violence?: No    Psychological " Assessment  History of Substance Abuse: Methamphetamine  History of Psychiatric Problems: No  Non-compliant with Treatment: No  Newly Diagnosed Illness: No    Discharge Risks or Barriers  Discharge risks or barriers?: Homeless / couch surfing  Patient risk factors: Homeless    Anticipated Discharge Information  Discharge Disposition: Discharged to home/self care (01)  Discharge Address: [facility]  [place] [address]  Discharge Contact Phone Number: [phone]

## 2025-01-13 NOTE — ASSESSMENT & PLAN NOTE
Complaining substernal chest pain and has been radiating to the right jaw and arm pain  Troponin negative in ED  proBNP is mildly elevated  EKG showed Sinus rhythm VCD, consider atypical RBBB  LVH with IVCD, LAD and secondary repol abnrm   Will trend troponin   Lipid low HDL, TSH wnl, A1c 5.8%  Echo unremarkable  Monitor on tele

## 2025-01-13 NOTE — PROGRESS NOTES
Time of Arrival: 2040    HR: 66  BP: 139/90  SpO2: 95  RR: 20  Temp: 98.8  Weight: 92.4 kg via bed scale    Does patient consent to inventory of belongings:     Patient does NOT consent to inventory of belongings    Belongings at bedside:  Cell phone, , Dentures, Wallet, Backpack, Shoes, Clothing, and Other (Please specify) wheelchair, black plastic bag of belongings.    4 Eyes Skin Assessment Completed by Lali RN and Jermaine RN.    Head WDL  Ears WDL  Nose WDL  Mouth WDL  Neck WDL  Breast/Chest WDL  Shoulder Blades WDL  Spine WDL  (R) Arm/Elbow/Hand Redness, Swelling, and Edema  (L) Arm/Elbow/Hand WDL  Abdomen WDL  Groin WDL  Scrotum/Coccyx/Buttocks Redness and Blanching  (R) Leg edema  (L) Leg Edema  (R) Heel/Foot/Toe Redness and Blanching  (L) Heel/Foot/Toe Redness and Blanching      Devices In Places ECG, Tele Box, Blood Pressure Cuff, Pulse Ox, and Nasal Cannula      Interventions In Place NC W/Ear Foams    Possible Skin Injury No    Pictures Uploaded Into Epic Yes  Wound Consult Placed no  RN Wound Prevention Protocol Ordered Yes

## 2025-01-13 NOTE — CARE PLAN
The patient is Watcher - Medium risk of patient condition declining or worsening    Shift Goals  Clinical Goals: hemodynamic stability  Patient Goals: pain control  Family Goals: jason    Progress made toward(s) clinical / shift goals:    Problem: Pain - Standard  Goal: Alleviation of pain or a reduction in pain to the patient’s comfort goal  Outcome: Progressing     Problem: Knowledge Deficit - Standard  Goal: Patient and family/care givers will demonstrate understanding of plan of care, disease process/condition, diagnostic tests and medications  Outcome: Progressing     Problem: Hemodynamics  Goal: Patient's hemodynamics, fluid balance and neurologic status will be stable or improve  Outcome: Progressing       Patient is not progressing towards the following goals:      Problem: Physical Regulation  Goal: Diagnostic test results will improve  Outcome: Not Progressing  Goal: Signs and symptoms of infection will decrease  Outcome: Not Progressing     Pt receiving IV antibiotics while aspirate from R wrist infection site are pending.

## 2025-01-13 NOTE — H&P
Hospital Medicine History & Physical Note    Date of Service  1/12/2025    Primary Care Physician  Pcp Not In Computer    Consultants  Orthopedics     Code Status  Full Code    Chief Complaint  Chief Complaint   Patient presents with    Chest Pain     R wrist pain radiates to chest       History of Presenting Illness  Juan Carlos Sibley is a 64 y.o. male who presented 1/12/2025 with past medical history of hypertension, PTSD, smoking, methamphetamine abuse, homeless, who presented to the emergency department complaining of substernal chest pain which has been radiating to his right jaw.  He is also complaining of right wrist pain and swelling which has been going on for about 2 days.  Mentioned that the right wrist pain has been radiating to his elbow and his neck.  Reports chills.  Denies fever.  Denies nausea, vomiting, diaphoresis or dizziness.    In the emergency department patient is on 2 L of oxygen.  He is hypertensive.  CBC showed leukocytosis.  Normal CHEM panel except .  proBNP 910.  Troponin 17.  CTA thoracoabdominal aorta showed 1.  No thoracic or abdominal aortic aneurysm or dissection. 2.  Mild bilateral dependent atelectasis.  3.  LEFT hydronephrosis with possible stone in the distal ureter although ureters not completely imaged.  Distal LEFT ureter displaced medially by large bladder diverticulum.  Plain film of the right wrist showed no acute fracture or dislocation.  Moderate osteoarthritis.  Dorsal soft tissue swelling.  Chest x-ray with no acute pathology.  Ultrasound of venous showed no DVT.    Patient had arthrocentesis of the right wrist.  Ortho was consulted.  I was requested to admit patient to the hospital for further management.    I discussed the plan of care with patient and dr. Chiu    Review of Systems  Review of Systems   Constitutional:  Positive for chills. Negative for fever and weight loss.   HENT:  Negative for congestion and sore throat.    Respiratory:  Negative for  "cough and shortness of breath.    Cardiovascular:  Positive for chest pain. Negative for leg swelling.   Gastrointestinal:  Negative for heartburn, nausea and vomiting.   Genitourinary:  Negative for dysuria.   Musculoskeletal:  Positive for joint pain. Negative for myalgias.        Right wrist pain        Past Medical History   has a past medical history of History of posttraumatic stress disorder (PTSD), Hypertension, and Smoking addiction.    Surgical History   has a past surgical history that includes other orthopedic surgery.     Family History  family history is not on file.   Family history reviewed with patient. There is no family history that is pertinent to the chief complaint.     Social History   reports that he has been smoking cigarettes. He does not have any smokeless tobacco history on file. He reports current alcohol use. He reports that he does not use drugs.    Allergies  Allergies   Allergen Reactions    Flexeril [Cyclobenzaprine Hcl] Anaphylaxis     Throat swelling    Lisinopril Cough    Terazosin Unspecified     [facility]    Valproic Acid Rash     \"rash on my chest and my eyes go out of focus\"    Vancomycin Itching    Bee Venom        Medications  Prior to Admission Medications   Prescriptions Last Dose Informant Patient Reported? Taking?   amLODIPine (NORVASC) 5 MG Tab Unknown Patient's Home Pharmacy Yes No   Sig: Take 5 mg by mouth every day.   aspirin 81 MG EC tablet Unknown Patient's Home Pharmacy Yes No   Sig: Take 81 mg by mouth every day.   carvedilol (COREG) 12.5 MG Tab Unknown Patient's Home Pharmacy Yes No   Sig: Take 12.5 mg by mouth 2 times a day with meals.   finasteride (PROSCAR) 5 MG Tab Unknown Patient's Home Pharmacy Yes No   Sig: Take 5 mg by mouth every day.   gabapentin (NEURONTIN) 300 MG Cap 1/11/2025 Bedtime Patient's Home Pharmacy, Patient No Yes   Sig: Take 1 Cap by mouth 3 times a day.   lidocaine (XYLOCAINE) 5 % Ointment Unknown Patient's Home Pharmacy Yes No "   Sig: Apply 1 Each topically 3 times a day.   losartan (COZAAR) 50 MG Tab Unknown Patient's Home Pharmacy Yes No   Sig: Take 50 mg by mouth every day.   rosuvastatin (CRESTOR) 40 MG tablet Unknown Patient's Home Pharmacy Yes No   Sig: Take 40 mg by mouth every day.   sacubitril-valsartan (ENTRESTO) 49-51 MG Tab Unknown Patient's Home Pharmacy Yes No   Sig: Take 1 Tablet by mouth 2 times a day.   sertraline (ZOLOFT) 100 MG Tab 1/11/2025 Bedtime Patient's Home Pharmacy, Patient Yes Yes   Sig: Take 100 mg by mouth every day.   spironolactone (ALDACTONE) 25 MG Tab Unknown Patient's Home Pharmacy Yes No   Sig: Take 25 mg by mouth 2 times a day.   tamsulosin (FLOMAX) 0.4 MG capsule Unknown Patient's Home Pharmacy Yes No   Sig: Take 0.4 mg by mouth every day.   therapeutic multivitamin-minerals (THERAGRAN-M) Tab Unknown Patient's Home Pharmacy Yes No   Sig: Take 1 Tablet by mouth every day.      Facility-Administered Medications: None       Physical Exam  Temp:  [36.6 °C (97.8 °F)] 36.6 °C (97.8 °F)  Pulse:  [80-87] 87  Resp:  [18-20] 18  BP: (195-222)/(103-133) 195/112  SpO2:  [87 %-98 %] 95 %  Blood Pressure : (!) 195/112   Temperature: 36.6 °C (97.8 °F)   Pulse: 87   Respiration: 18   Pulse Oximetry: 95 %       Physical Exam  Constitutional:       Appearance: He is ill-appearing.   HENT:      Head: Normocephalic.      Nose: Nose normal.      Mouth/Throat:      Mouth: Mucous membranes are moist.   Cardiovascular:      Rate and Rhythm: Normal rate and regular rhythm.   Pulmonary:      Effort: Pulmonary effort is normal.      Breath sounds: Normal breath sounds.   Abdominal:      General: Abdomen is flat.      Palpations: Abdomen is soft.   Musculoskeletal:         General: Normal range of motion.      Comments: Right wrist swelling and redness  ROM is limisted by pain   Skin:     General: Skin is warm.   Neurological:      General: No focal deficit present.      Mental Status: He is alert and oriented to person, place,  and time.         Laboratory:  Recent Labs     01/12/25  1258   WBC 13.3*   RBC 4.64*   HEMOGLOBIN 14.0   HEMATOCRIT 43.1   MCV 92.9   MCH 30.2   MCHC 32.5   RDW 46.3   PLATELETCT 314   MPV 9.8     Recent Labs     01/12/25  1258   SODIUM 139   POTASSIUM 3.3*   CHLORIDE 103   CO2 27   GLUCOSE 81   BUN 16   CREATININE 1.08   CALCIUM 9.3     Recent Labs     01/12/25  1258   ALTSGPT 13   ASTSGOT 23   ALKPHOSPHAT 136*   TBILIRUBIN 0.2   GLUCOSE 81         Recent Labs     01/12/25  1258   NTPROBNP 910*         Recent Labs     01/12/25  1258 01/12/25  1507   TROPONINT 19 17       Imaging:  US-EXTREMITY ARTERY UPPER UNILAT RIGHT   Final Result      CT-CTA COMPLETE THORACOABDOMINAL AORTA   Final Result      1.  No thoracic or abdominal aortic aneurysm or dissection.   2.  Mild bilateral dependent atelectasis.   3.  LEFT hydronephrosis with possible stone in the distal ureter although ureters not completely imaged.  Distal LEFT ureter displaced medially by large bladder diverticulum.                           DX-WRIST-LIMITED 2- RIGHT   Final Result      1.  No fracture or dislocation of RIGHT wrist.   2.  Moderate osteoarthritis   3.  Dorsal soft tissue swelling.      DX-CHEST-PORTABLE (1 VIEW)   Final Result      No acute cardiopulmonary disease.      US-RENAL    (Results Pending)         Assessment/Plan:  Justification for Admission Status  I anticipate this patient will require at least two midnights for appropriate medical management, necessitating inpatient admission because of right wrist pain and chest pain requiring antibiotics and surgery evaluation.     Patient will need a Med/Surg bed on EMERGENCY service .  The need is secondary to above.    * Right wrist pain  Assessment & Plan  Complaining of right wrist pain with swelling for the past 2 days  Associated with chills, and pain radiates to the elbow and right jaw  Status post arthrocentesis in ED  Plain film showed osteoarthritis and dorsum soft tissue  swelling  US venous with DVT  Check uric acid   Received IV Ancef  Orthopedic has been consulted and will see patient  Follow-up on the cultures  Continue IV and biotics    Chest pain  Assessment & Plan  Complaining substernal chest pain and has been radiating to the right jaw and arm pain  Troponin negative in ED  proBNP is mildly elevated  EKG showed Sinus rhythm VCD, consider atypical RBBB  LVH with IVCD, LAD and secondary repol abnrm   Will trend troponin   Check lipid, TSH, A1c   Echo ordered   Monitor on tele       Acute hypoxic respiratory failure (HCC)  Assessment & Plan  CTA thoracoabdominal aorta showed mild bilateral dependent atelectasis   proBNP ~900, patient appears euvolemic  Chest x-ray with no acute pathology  Will check for COVID-19  Incentive spirometry  Wean oxygen as tolerated      Leukocytosis  Assessment & Plan  Secondary to right wrist cellulitis   Repeat CBC in am     Hydronephrosis  Assessment & Plan  Incidental finding on CT imaging  LEFT hydronephrosis with possible stone in the distal ureter although ureters not completely imaged.  Distal LEFT ureter displaced medially by large bladder diverticulum  Normal renal function   Will obtain US renal to further evaluate         Drug abuse (HCC)  Assessment & Plan  Report intermittently uses meth  Last use was 24 hours ago  Patient states that he has been smoking meth  Check urine drug screen      Hypertensive urgency  Assessment & Plan  Continue home medication   IV BP meds with parameters   Continue closely monitoring    Chronic combined systolic and diastolic heart failure (EF 40%)- (present on admission)  Assessment & Plan  Hx of   proBNP in   Patient is euvolemic  Continues Aldactone, Entresto amlodipine, and losartan, and Coreg  Repeat echo has been ordered       Hypokalemia  Assessment & Plan  Repleted in ED  Repeat BMP in am     Advance care planning  Assessment & Plan  Full code    Homeless  Assessment & Plan  Reports he is  homeless         VTE prophylaxis: enoxaparin ppx

## 2025-01-13 NOTE — ANESTHESIA PREPROCEDURE EVALUATION
" Case: [id number] Date/Time: 01/13/25 1420    Procedure: INCISION AND DRAINAGE, WOUND, BY ORTHOPEDICS (Right: Wrist)    Location: [place] [address] / SURGERY [facility]    Surgeons: Hugh Caldwell M.D.            Past Medical History:   Diagnosis Date    History of posttraumatic stress disorder (PTSD)     Hypertension     not taking meds    Smoking addiction    Patient reports last using meth yesterday  [facility], given [facility]s that he never filled    Past Surgical History:   Procedure Laterality Date    OTHER ORTHOPEDIC SURGERY      reports chronic back and neck pain       Current Outpatient Medications   Medication Instructions    amLODIPine (NORVASC) 5 mg, Oral, DAILY    aspirin 81 mg, Oral, DAILY    carvedilol (COREG) 12.5 mg, Oral, 2 TIMES DAILY WITH MEALS    finasteride (PROSCAR) 5 mg, Oral, DAILY    gabapentin (NEURONTIN) 300 mg, Oral, 3 TIMES DAILY    lidocaine (XYLOCAINE) 5 % Ointment 1 Each, Topical, 3 TIMES DAILY    losartan (COZAAR) 50 mg, Oral, DAILY    rosuvastatin (CRESTOR) 40 mg, Oral, DAILY    sacubitril-valsartan (ENTRESTO) 49-51 MG Tab 1 Tablet, Oral, 2 TIMES DAILY    sertraline (ZOLOFT) 100 mg, Oral, DAILY    spironolactone (ALDACTONE) 25 mg, Oral, 2 TIMES DAILY    tamsulosin (FLOMAX) 0.4 mg, Oral, DAILY    therapeutic multivitamin-minerals (THERAGRAN-M) Tab 1 Tablet, Oral, DAILY       Social History     Tobacco Use    Smoking status: Every Day     Current packs/day: 0.50     Types: Cigarettes   Substance Use Topics    Alcohol use: Yes     Comment: \"rarely\"    Drug use: No       BP (!) 136/92   Pulse 61   Temp 36.5 °C (97.7 °F) (Temporal)   Resp 18   Ht 1.956 m (6' 5\")   Wt 92.4 kg (203 lb 11.3 oz)   SpO2 97%     Allergies   Allergen Reactions    Flexeril [Cyclobenzaprine Hcl] Anaphylaxis     Throat swelling    Lisinopril Cough    Terazosin Unspecified     [facility]    Valproic Acid Rash     \"rash on my chest and my eyes go out of focus\"    Vancomycin " Itching    Bee Venom        Lab Results   Component Value Date/Time    SODIUM 137 01/13/2025 01:05 AM    POTASSIUM 4.4 01/13/2025 01:05 AM    CHLORIDE 103 01/13/2025 01:05 AM    GLUCOSE 118 (H) 01/13/2025 01:05 AM    BUN 16 01/13/2025 01:05 AM    CREATININE 1.08 01/13/2025 01:05 AM        Lab Results   Component Value Date/Time    WBC 7.7 01/13/2025 01:05 AM    RBC 4.17 (L) 01/13/2025 01:05 AM    HEMOGLOBIN 12.7 (L) 01/13/2025 01:05 AM    HEMATOCRIT 38.3 (L) 01/13/2025 01:05 AM    PLATELETCT 260 01/13/2025 01:05 AM      TTE 2016:  CONCLUSIONS  Normal left ventricular size and systolic function.  Left ventricular ejection fraction is visually estimated to be 60%.  Grade I diastolic dysfunction.  Mildly dilated right ventricle.  Normal right ventricular systolic function.  No significant valve abnormalities.   Estimated right ventricular systolic pressure  is 40 mmHg.    Relevant Problems   CARDIAC   (positive) CAD (coronary artery disease)   (positive) HTN (hypertension)   (positive) Hypertensive urgency         (positive) Hydronephrosis       Physical Exam    Airway   Mallampati: II  TM distance: >3 FB  Neck ROM: full       Cardiovascular - normal exam  Rhythm: regular  Rate: normal  (-) murmur     Dental - normal exam           Pulmonary - normal exam  Breath sounds clear to auscultation     Abdominal    Neurological - normal exam                   Anesthesia Plan    ASA 4- EMERGENT   ASA physical status 4 criteria: acute alcohol or substance abuse withdrawal and CAD/stents - recent (< 3 months)ASA physical status emergent criteria: acutely contaminated wound or identified infection source    Plan - general       Airway plan will be LMA          Induction: intravenous    Postoperative Plan: Postoperative administration of opioids is intended.    Pertinent diagnostic labs and testing reviewed    Informed Consent:    Anesthetic plan and risks discussed with patient.      Anesthetic procedure and risks discussed  with patient in detail.  Risks include but are not limited to PONV, pain, sore throat, damage to teeth/lips/gums, aspiration, positioning injury, allergic reaction, vocal cord injury, prolonged intubation, stroke, and/or cardiopulmonary problems up to and including death.  Patient indicates complete understanding. All questions fully answered and they agree to proceed as planned above.

## 2025-01-13 NOTE — ED NOTES
Patient transferred to T830 with ED RN on monitor and O2. All belongings in possession left with patient.

## 2025-01-13 NOTE — ED NOTES
Called lab for add on. Lab states they will add urice acid, lipid profile, TSH, and hemoglobin A1C on.

## 2025-01-13 NOTE — THERAPY
Occupational Therapy Contact Note    Patient Name: Juan Carlos Sibley  Age:  64 y.o., Sex:  male  Medical Record #: [id number]  Today's Date: 1/13/2025    Discussed missed therapy with Radha       01/13/25 5139   Interdisciplinary Plan of Care Collaboration   Collaboration Comments OT eval attempted, pt in OR for I&D of his right wrist, will attempt OT eval another day

## 2025-01-13 NOTE — ASSESSMENT & PLAN NOTE
Incidental finding on CT imaging  LEFT hydronephrosis with possible stone in the distal ureter although ureters not completely imaged.  Distal LEFT ureter displaced medially by large bladder diverticulum  Normal renal function   Ultrasound also shows moderate left hydroureter and hydronephrosis

## 2025-01-13 NOTE — ASSESSMENT & PLAN NOTE
CTA thoracoabdominal aorta showed mild bilateral dependent atelectasis   proBNP ~900, patient appears euvolemic  Chest x-ray with no acute pathology  Will check for COVID-19  Incentive spirometry  Wean oxygen as tolerated

## 2025-01-13 NOTE — PROGRESS NOTES
Hospital Medicine Daily Progress Note    Date of Service  1/13/2025    Chief Complaint  Juan Carlos Sibley is a 64 y.o. male admitted 1/12/2025 with     Hospital Course  No notes on file    Interval Problem Update  1/13  No acute events overnight  Patient evaluated on telemetry floor  Has acute right wrist pain and swelling that is still very painful to  Plan for OR today with Ortho    I have discussed this patient's plan of care and discharge plan at IDT rounds today with Case Management, Nursing, Nursing leadership, and other members of the IDT team.    Consultants/Specialty  orthopedics    Code Status  Full Code    Disposition  <MEDICALLYCLEARED>  I have placed the appropriate orders for post-discharge needs.    Review of Systems  Review of Systems   Constitutional:  Negative for fever.   Respiratory:  Negative for cough.    Cardiovascular:  Negative for chest pain.   Genitourinary:  Negative for dysuria.   Musculoskeletal:  Positive for joint pain.        Physical Exam  Temp:  [36.5 °C (97.7 °F)-37.1 °C (98.8 °F)] 36.5 °C (97.7 °F)  Pulse:  [70-87] 79  Resp:  [18-20] 18  BP: (139-222)/() 141/90  SpO2:  [87 %-98 %] 95 %    Physical Exam  Constitutional:       General: He is not in acute distress.     Appearance: He is not toxic-appearing.   HENT:      Head: Normocephalic and atraumatic.      Mouth/Throat:      Mouth: Mucous membranes are moist.      Pharynx: Oropharynx is clear.   Eyes:      Extraocular Movements: Extraocular movements intact.   Cardiovascular:      Rate and Rhythm: Normal rate and regular rhythm.      Pulses: Normal pulses.      Heart sounds: Normal heart sounds.   Pulmonary:      Effort: Pulmonary effort is normal.      Breath sounds: Normal breath sounds.   Abdominal:      Palpations: Abdomen is soft.   Musculoskeletal:      Comments: Right wrist swollen and tender   Skin:     General: Skin is warm and dry.         Fluids    Intake/Output Summary (Last 24 hours) at 1/13/2025 1002  Last data  filed at 1/13/2025 0734  Gross per 24 hour   Intake 250 ml   Output 480 ml   Net -230 ml        Laboratory  Recent Labs     01/12/25  1258 01/13/25  0105   WBC 13.3* 7.7   RBC 4.64* 4.17*   HEMOGLOBIN 14.0 12.7*   HEMATOCRIT 43.1 38.3*   MCV 92.9 91.8   MCH 30.2 30.5   MCHC 32.5 33.2   RDW 46.3 46.5   PLATELETCT 314 260   MPV 9.8 10.2     Recent Labs     01/12/25  1258 01/13/25  0105   SODIUM 139 137   POTASSIUM 3.3* 4.4   CHLORIDE 103 103   CO2 27 25   GLUCOSE 81 118*   BUN 16 16   CREATININE 1.08 1.08   CALCIUM 9.3 8.6             Recent Labs     01/12/25  1507   TRIGLYCERIDE 144   HDL 31*   LDL 94       Imaging  US-RENAL   Final Result      1.  Moderate left hydronephrosis and hydroureter.   2.  Debris in the bladder lumen with a large posterior bladder diverticulum measuring 12.5 x 8.5 x 5.3 cm in diameter.   3.  Bladder volume is 590 mL.   4.  Bilateral medical renal disease.   5.  Incidentally noted is a simple cyst in the right lobe of the liver measuring 4 x 4 0.2 x 3.2 cm in diameter.      US-EXTREMITY ARTERY UPPER UNILAT RIGHT   Final Result      CT-CTA COMPLETE THORACOABDOMINAL AORTA   Final Result      1.  No thoracic or abdominal aortic aneurysm or dissection.   2.  Mild bilateral dependent atelectasis.   3.  LEFT hydronephrosis with possible stone in the distal ureter although ureters not completely imaged.  Distal LEFT ureter displaced medially by large bladder diverticulum.                           DX-WRIST-LIMITED 2- RIGHT   Final Result      1.  No fracture or dislocation of RIGHT wrist.   2.  Moderate osteoarthritis   3.  Dorsal soft tissue swelling.      DX-CHEST-PORTABLE (1 VIEW)   Final Result      No acute cardiopulmonary disease.           Assessment/Plan  * Right wrist pain  Assessment & Plan  Complaining of right wrist pain with swelling for the past 2 days  Associated with chills, and pain radiates to the elbow and right jaw  Status post arthrocentesis in ED  Plain film showed  osteoarthritis and dorsum soft tissue swelling  US venous with DVT  Check uric acid   Received IV Ancef  Orthopedic has been consulted and will see patient  Follow-up on the cultures  Continue IV and biotics    Acute hypoxic respiratory failure (HCC)  Assessment & Plan  CTA thoracoabdominal aorta showed mild bilateral dependent atelectasis   proBNP ~900, patient appears euvolemic  Chest x-ray with no acute pathology  Will check for COVID-19  Incentive spirometry  Wean oxygen as tolerated      Leukocytosis  Assessment & Plan  Secondary to right wrist cellulitis   Repeat CBC in am     Hypokalemia  Assessment & Plan  Repleted in ED  Repeat BMP in am     Advance care planning  Assessment & Plan  Full code    Homeless  Assessment & Plan  Reports he is homeless     Hydronephrosis  Assessment & Plan  Incidental finding on CT imaging  LEFT hydronephrosis with possible stone in the distal ureter although ureters not completely imaged.  Distal LEFT ureter displaced medially by large bladder diverticulum  Normal renal function   Will obtain US renal to further evaluate         Drug abuse (HCC)  Assessment & Plan  Report intermittently uses meth  Last use was 24 hours ago  Patient states that he has been smoking meth  Check urine drug screen      Hypertensive urgency  Assessment & Plan  Continue home medication   IV BP meds with parameters   Continue closely monitoring    Chronic combined systolic and diastolic heart failure (EF 40%)- (present on admission)  Assessment & Plan  Hx of   proBNP in   Patient is euvolemic  Continues Aldactone, Entresto amlodipine, and losartan, and Coreg  Repeat echo has been ordered       Chest pain  Assessment & Plan  Complaining substernal chest pain and has been radiating to the right jaw and arm pain  Troponin negative in ED  proBNP is mildly elevated  EKG showed Sinus rhythm VCD, consider atypical RBBB  LVH with IVCD, LAD and secondary repol abnrm   Will trend troponin   Check lipid,  TSH, A1c   Echo ordered   Monitor on tele            VTE prophylaxis: VTE Selection    I have performed a physical exam and reviewed and updated ROS and Plan today (1/13/2025). In review of yesterday's note (1/12/2025), there are no changes except as documented above.

## 2025-01-13 NOTE — ASSESSMENT & PLAN NOTE
Hx of   proBNP in   Patient is euvolemic  Continues Aldactone, Entresto amlodipine, and losartan, and Coreg  Repeat echo unremarkable with a EF of 55% this visit

## 2025-01-13 NOTE — PROGRESS NOTES
Assumed care of patient; received bedside report from Lali SALAS. Patient voices no needs at this time.

## 2025-01-13 NOTE — CARE PLAN
The patient is Stable - Low risk of patient condition declining or worsening    Shift Goals  Clinical Goals: hemodynamic stability, pain control  Patient Goals: pain control  Family Goals: jason    Progress made toward(s) clinical / shift goals:  hemodynamic stability, pain control    Patient is not progressing towards the following goals:

## 2025-01-13 NOTE — ANESTHESIA PROCEDURE NOTES
Airway    Date/Time: 1/13/2025 3:41 PM    Performed by: Tobias Trejo M.D.  Authorized by: Tobias Trejo M.D.    Location:  OR  Urgency:  Elective  Indications for Airway Management:  Anesthesia      Spontaneous Ventilation: absent    Sedation Level:  Deep  Preoxygenated: Yes    Final Airway Type:  Supraglottic airway  Final Supraglottic Airway:  Standard LMA    SGA Size:  5  Number of Attempts at Approach:  1

## 2025-01-14 LAB — PATHOLOGY CONSULT NOTE: NORMAL

## 2025-01-14 PROCEDURE — A9270 NON-COVERED ITEM OR SERVICE: HCPCS

## 2025-01-14 PROCEDURE — 700102 HCHG RX REV CODE 250 W/ 637 OVERRIDE(OP)

## 2025-01-14 PROCEDURE — 770001 HCHG ROOM/CARE - MED/SURG/GYN PRIV*

## 2025-01-14 PROCEDURE — 700105 HCHG RX REV CODE 258: Performed by: STUDENT IN AN ORGANIZED HEALTH CARE EDUCATION/TRAINING PROGRAM

## 2025-01-14 PROCEDURE — A9270 NON-COVERED ITEM OR SERVICE: HCPCS | Performed by: STUDENT IN AN ORGANIZED HEALTH CARE EDUCATION/TRAINING PROGRAM

## 2025-01-14 PROCEDURE — 700102 HCHG RX REV CODE 250 W/ 637 OVERRIDE(OP): Performed by: STUDENT IN AN ORGANIZED HEALTH CARE EDUCATION/TRAINING PROGRAM

## 2025-01-14 PROCEDURE — 700111 HCHG RX REV CODE 636 W/ 250 OVERRIDE (IP): Performed by: STUDENT IN AN ORGANIZED HEALTH CARE EDUCATION/TRAINING PROGRAM

## 2025-01-14 PROCEDURE — 99233 SBSQ HOSP IP/OBS HIGH 50: CPT | Performed by: HOSPITALIST

## 2025-01-14 RX ADMIN — ASPIRIN 81 MG: 81 TABLET, COATED ORAL at 05:10

## 2025-01-14 RX ADMIN — FINASTERIDE 5 MG: 5 TABLET, FILM COATED ORAL at 05:07

## 2025-01-14 RX ADMIN — OXYCODONE HYDROCHLORIDE 10 MG: 10 TABLET ORAL at 03:54

## 2025-01-14 RX ADMIN — SPIRONOLACTONE 25 MG: 25 TABLET ORAL at 16:55

## 2025-01-14 RX ADMIN — SERTRALINE HYDROCHLORIDE 100 MG: 100 TABLET, FILM COATED ORAL at 20:10

## 2025-01-14 RX ADMIN — ENOXAPARIN SODIUM 40 MG: 100 INJECTION SUBCUTANEOUS at 16:57

## 2025-01-14 RX ADMIN — CARVEDILOL 12.5 MG: 12.5 TABLET, FILM COATED ORAL at 16:55

## 2025-01-14 RX ADMIN — OXYCODONE HYDROCHLORIDE 10 MG: 10 TABLET ORAL at 15:15

## 2025-01-14 RX ADMIN — SACUBITRIL AND VALSARTAN 1 TABLET: 49; 51 TABLET, FILM COATED ORAL at 05:08

## 2025-01-14 RX ADMIN — Medication 1 TABLET: at 05:08

## 2025-01-14 RX ADMIN — ROSUVASTATIN CALCIUM 40 MG: 20 TABLET, FILM COATED ORAL at 05:07

## 2025-01-14 RX ADMIN — GABAPENTIN 300 MG: 300 CAPSULE ORAL at 05:08

## 2025-01-14 RX ADMIN — SPIRONOLACTONE 25 MG: 25 TABLET ORAL at 05:07

## 2025-01-14 RX ADMIN — TAMSULOSIN HYDROCHLORIDE 0.4 MG: 0.4 CAPSULE ORAL at 05:07

## 2025-01-14 RX ADMIN — CEFAZOLIN 2 G: 2 INJECTION, POWDER, FOR SOLUTION INTRAMUSCULAR; INTRAVENOUS at 21:55

## 2025-01-14 RX ADMIN — CEFAZOLIN 2 G: 2 INJECTION, POWDER, FOR SOLUTION INTRAMUSCULAR; INTRAVENOUS at 14:33

## 2025-01-14 RX ADMIN — CEFAZOLIN 2 G: 2 INJECTION, POWDER, FOR SOLUTION INTRAMUSCULAR; INTRAVENOUS at 05:11

## 2025-01-14 RX ADMIN — CARVEDILOL 12.5 MG: 12.5 TABLET, FILM COATED ORAL at 08:03

## 2025-01-14 RX ADMIN — GABAPENTIN 300 MG: 300 CAPSULE ORAL at 11:50

## 2025-01-14 RX ADMIN — GABAPENTIN 300 MG: 300 CAPSULE ORAL at 16:54

## 2025-01-14 RX ADMIN — OXYCODONE HYDROCHLORIDE 10 MG: 10 TABLET ORAL at 08:03

## 2025-01-14 RX ADMIN — AMLODIPINE BESYLATE 5 MG: 5 TABLET ORAL at 05:08

## 2025-01-14 RX ADMIN — OXYCODONE HYDROCHLORIDE 10 MG: 10 TABLET ORAL at 20:10

## 2025-01-14 RX ADMIN — SACUBITRIL AND VALSARTAN 1 TABLET: 49; 51 TABLET, FILM COATED ORAL at 16:55

## 2025-01-14 SDOH — ECONOMIC STABILITY: TRANSPORTATION INSECURITY
IN THE PAST 12 MONTHS, HAS LACK OF RELIABLE TRANSPORTATION KEPT YOU FROM MEDICAL APPOINTMENTS, MEETINGS, WORK OR FROM GETTING THINGS NEEDED FOR DAILY LIVING?: YES

## 2025-01-14 SDOH — ECONOMIC STABILITY: TRANSPORTATION INSECURITY
IN THE PAST 12 MONTHS, HAS THE LACK OF TRANSPORTATION KEPT YOU FROM MEDICAL APPOINTMENTS OR FROM GETTING MEDICATIONS?: YES

## 2025-01-14 ASSESSMENT — PAIN DESCRIPTION - PAIN TYPE
TYPE: SURGICAL PAIN
TYPE: ACUTE PAIN
TYPE: SURGICAL PAIN
TYPE: ACUTE PAIN

## 2025-01-14 ASSESSMENT — SOCIAL DETERMINANTS OF HEALTH (SDOH)
WITHIN THE LAST YEAR, HAVE YOU BEEN HUMILIATED OR EMOTIONALLY ABUSED IN OTHER WAYS BY YOUR PARTNER OR EX-PARTNER?: NO
WITHIN THE LAST YEAR, HAVE YOU BEEN AFRAID OF YOUR PARTNER OR EX-PARTNER?: NO
WITHIN THE LAST YEAR, HAVE TO BEEN RAPED OR FORCED TO HAVE ANY KIND OF SEXUAL ACTIVITY BY YOUR PARTNER OR EX-PARTNER?: NO
WITHIN THE LAST YEAR, HAVE YOU BEEN KICKED, HIT, SLAPPED, OR OTHERWISE PHYSICALLY HURT BY YOUR PARTNER OR EX-PARTNER?: NO
WITHIN THE PAST 12 MONTHS, YOU WORRIED THAT YOUR FOOD WOULD RUN OUT BEFORE YOU GOT THE MONEY TO BUY MORE: OFTEN TRUE
WITHIN THE PAST 12 MONTHS, THE FOOD YOU BOUGHT JUST DIDN'T LAST AND YOU DIDN'T HAVE MONEY TO GET MORE: OFTEN TRUE
IN THE PAST 12 MONTHS, HAS THE ELECTRIC, GAS, OIL, OR WATER COMPANY THREATENED TO SHUT OFF SERVICE IN YOUR HOME?: YES

## 2025-01-14 ASSESSMENT — PATIENT HEALTH QUESTIONNAIRE - PHQ9
6. FEELING BAD ABOUT YOURSELF - OR THAT YOU ARE A FAILURE OR HAVE LET YOURSELF OR YOUR FAMILY DOWN: NOT AL ALL
8. MOVING OR SPEAKING SO SLOWLY THAT OTHER PEOPLE COULD HAVE NOTICED. OR THE OPPOSITE, BEING SO FIGETY OR RESTLESS THAT YOU HAVE BEEN MOVING AROUND A LOT MORE THAN USUAL: NOT AT ALL
2. FEELING DOWN, DEPRESSED, IRRITABLE, OR HOPELESS: NEARLY EVERY DAY
3. TROUBLE FALLING OR STAYING ASLEEP OR SLEEPING TOO MUCH: NOT AT ALL
5. POOR APPETITE OR OVEREATING: SEVERAL DAYS
4. FEELING TIRED OR HAVING LITTLE ENERGY: SEVERAL DAYS
SUM OF ALL RESPONSES TO PHQ9 QUESTIONS 1 AND 2: 5
7. TROUBLE CONCENTRATING ON THINGS, SUCH AS READING THE NEWSPAPER OR WATCHING TELEVISION: NOT AT ALL
9. THOUGHTS THAT YOU WOULD BE BETTER OFF DEAD, OR OF HURTING YOURSELF: NOT AT ALL
1. LITTLE INTEREST OR PLEASURE IN DOING THINGS: MORE THAN HALF THE DAYS
SUM OF ALL RESPONSES TO PHQ QUESTIONS 1-9: 7

## 2025-01-14 ASSESSMENT — ENCOUNTER SYMPTOMS
FEVER: 0
CHILLS: 0
COUGH: 0

## 2025-01-14 ASSESSMENT — FIBROSIS 4 INDEX
FIB4 SCORE: 1.581138830084189666
FIB4 SCORE: 1.581138830084189666

## 2025-01-14 NOTE — OP REPORT
DATE OF OPERATION: 1/13/2025     PREOPERATIVE DIAGNOSIS:  Right wrist septic arthritis    POSTOPERATIVE DIAGNOSIS: Same    PROCEDURE PERFORMED:   Incision and drainage right wrist    SURGEON: Hugh Caldwell M.D.     ASSISTANT: None    ANESTHESIA: General    SPECIMEN: Culture swab x 1, pathology specimen x 1    ESTIMATED BLOOD LOSS: 5 mL    IMPLANTS: None    INDICATIONS: The patient is a 64 y.o. male who presented with right wrist pain and swelling with aspirate demonstrating many white blood cells but no organisms.  I discussed the risks and benefits of the above procedure which include but are not limited to risks of infection, wound healing complication, neurovascular injury, pain, malunion, non-union, malrotation, and the medical risks of anesthesia including MI, stroke, and death.  Alternatives to surgery were also discussed, including non-operative management, which I did not recommend.  The patient and/or their POA was in agreement with the plan to proceed, and the informed consent was signed and documented.    DESCRIPTION OF PROCEDURE:  Patient was seen in the preoperative holding area on the day of surgery and marked on the operative site which was the right wrist. They were transported to the operating room and positioned supine on the operating table.  Care was taken to pad any bony prominences and prominent neurovascular structures.  Anesthesia was induced.  The operative extremity was prescrubbed with a CHG brush followed by an alcohol bath and then prepped and draped in the usual sterile fashion.  A time out was performed in which the correct patient, site, side, procedure, and surgeon's initials on extremity were confirmed by all operating personnel.     We then turned our attention to the right wrist.  I began by elevating the arm and inflated the pneumatic tourniquet.  I then made a dorsal approach to the wrist incised through skin subcutaneous tissue down to level of the extensor retinaculum.   The distal portion of this was incised and I was able to palpate the wrist capsule.  Using a Schnidt I was able to enter this bluntly and then spread and there was immediate expression of cloudy fluid.  There appeared to be gouty tophi intermixed.  Culture swab was taken.  Some of this was collected for pathology specimen as well.  I then performed performed an excisional debridement and exploration of the joint using a rongeur.  Some of this excised tissue was added to the pathology specimen.  I then irrigated the entire field thoroughly with saline.  The extensor retinaculum distal portion was reapposed using 2-0 Vicryl suture.  The skin was then closed using 3-0 nylon interrupted vertical mattress fashion.  Steri-Strips followed by sterile dressing were then placed.  Patient was awoke from anesthesia without immediate complication transfer the PACU in stable condition.    POSTOPERATIVE PLAN:      Inpatient plan: PACU to floor.  Continue culture directed antibiotics per primary team.  Follow culture and pathology specimens.  Mobilize with PT and OT.  Weightbearing status: Light weightbearing as tolerated right upper extremity until the incision heals  DVT prophylaxis: Per primary team  Outpatient plan: Follow-up in [facility] in 2 weeks for wound check and possible suture removal.    ____________________________________   Hugh Caldwell M.D.   DD: 1/13/2025  4:46 PM

## 2025-01-14 NOTE — OR NURSING
Report called to receiving MARITZA Brand. Pt taken via bed to T830 w/ transport. VSS. No distress.

## 2025-01-14 NOTE — ANESTHESIA POSTPROCEDURE EVALUATION
Patient: Juan Carlos Sibley    Procedure Summary       Date: 01/13/25 Room / Location: [place] [address] / SURGERY [facility]    Anesthesia Start: 1530 Anesthesia Stop: 1619    Procedure: INCISION AND DRAINAGE, WOUND, BY ORTHOPEDICS (Right: Wrist) Diagnosis: (right wrist septic arthritis)    Surgeons: Hugh Caldwell M.D. Responsible Provider: Tobias Trejo M.D.    Anesthesia Type: general ASA Status: 4 - Emergent            Final Anesthesia Type: general  Last vitals  BP   Blood Pressure : 134/78    Temp   35.9 °C (96.7 °F)    Pulse   65   Resp   (!) 9    SpO2   100 %      Anesthesia Post Evaluation    Patient location during evaluation: PACU  Patient participation: complete - patient participated  Level of consciousness: sleepy but conscious  Pain score: 3    Airway patency: patent  Anesthetic complications: no  Cardiovascular status: hemodynamically stable  Respiratory status: acceptable  Hydration status: balanced    PONV: none          There were no known notable events for this encounter.     Nurse Pain Score: 3 (NPRS)

## 2025-01-14 NOTE — CARE PLAN
Problem: Knowledge Deficit - Standard  Goal: Patient and family/care givers will demonstrate understanding of plan of care, disease process/condition, diagnostic tests and medications  Outcome: Progressing     Problem: Hemodynamics  Goal: Patient's hemodynamics, fluid balance and neurologic status will be stable or improve  Outcome: Progressing     Problem: Fall Risk  Goal: Patient will remain free from falls  Outcome: Progressing   The patient is Stable - Low risk of patient condition declining or worsening    Shift Goals  Clinical Goals: monitor surgical site  Patient Goals: pain control  Family Goals: jason    Progress made toward(s) clinical / shift goals:  Progressing    Patient is not progressing towards the following goals:

## 2025-01-14 NOTE — PROGRESS NOTES
"      Orthopaedic Progress Note    Interval changes:  Patient doing well post op  R wrist dressings are CDI  Cultures with NGTD      ROS - Patient denies any new issues.  Pain well controlled.    BP (!) 132/91   Pulse 73   Temp 36.8 °C (98.2 °F) (Temporal)   Resp 17   Ht 1.956 m (6' 5\")   Wt 92.8 kg (204 lb 9.4 oz)   SpO2 92%     Patient seen and examined  No acute distress  Breathing non labored  RRR  R wrist dressings CDI, DNVI, moves all fingers, cap refill <2 sec.     Recent Labs     01/12/25  1258 01/13/25  0105   WBC 13.3* 7.7   RBC 4.64* 4.17*   HEMOGLOBIN 14.0 12.7*   HEMATOCRIT 43.1 38.3*   MCV 92.9 91.8   MCH 30.2 30.5   MCHC 32.5 33.2   RDW 46.3 46.5   PLATELETCT 314 260   MPV 9.8 10.2       Active Hospital Problems    Diagnosis     Hypertensive urgency [I16.0]     Right wrist pain [M25.531]     Drug abuse (HCC) [F19.10]     Hydronephrosis [N13.30]     Homeless [Z59.00]     Advance care planning [Z71.89]     Wrist pain, acute, right [M25.531]     Hypokalemia [E87.6]     Leukocytosis [D72.829]     Acute hypoxic respiratory failure (HCC) [J96.01]     Chronic combined systolic and diastolic heart failure (EF 40%) [I50.42]        Assessment/Plan:  Patient doing well post op  R wrist dressings are CDI  Cultures with NGTD    POD#1 Incision and drainage right wrist   Wt bearing status - light WBAT until incision heals  Wound care/Drains - Dressings to be changed every other day by nursing. Or PRN for saturation starting POD#2  Future Procedures - none planned   Lovenox: Start 1/12, Duration-until ambulatory > 150'  Sutures/Staples out- 14-21 days post operatively. Removal will completed by ortho mid levels only.  PT/OT-initiated  Antibiotics: ancef 2g IV q8  DVT Prophylaxis- TEDS/SCDs/Foot pumps  Loaiza-not needed per ortho  Case Coordination for Discharge Planning - Disposition per therapy recs.    "

## 2025-01-14 NOTE — CARE PLAN
The patient is Stable - Low risk of patient condition declining or worsening    Shift Goals  Clinical Goals: pain control, monitor VS, wean O2  Patient Goals: pain control  Family Goals: jason    Progress made toward(s) clinical / shift goals:    Problem: Pain - Standard  Goal: Alleviation of pain or a reduction in pain to the patient’s comfort goal  Outcome: Progressing     Problem: Knowledge Deficit - Standard  Goal: Patient and family/care givers will demonstrate understanding of plan of care, disease process/condition, diagnostic tests and medications  Outcome: Progressing     Problem: Hemodynamics  Goal: Patient's hemodynamics, fluid balance and neurologic status will be stable or improve  Outcome: Progressing     Problem: Physical Regulation  Goal: Diagnostic test results will improve  Outcome: Progressing  Goal: Signs and symptoms of infection will decrease  Outcome: Progressing     Problem: Fall Risk  Goal: Patient will remain free from falls  Outcome: Progressing

## 2025-01-14 NOTE — PROGRESS NOTES
Bedside report received from day RN, pt care assumed, assessment completed. Pt is A&O4, pain 6/10, SR on the monitor. Updated on POC, questions answered. Bed in lowest, locked position, treaded socks on, call light and belongings within reach. Fall precautions in place.      Fall Risk Score: MODERATE RISK  Fall risk interventions in place: Provide patient/family education based on risk assessment, Educate patient/family to call staff for assistance when getting out of bed, Place fall precaution signage outside patient door, Utilize bed/chair fall alarm, and Bed alarm connected correctly  Bed type: Regular (Brian Score less than 17 interventions in place)  Patient on cardiac monitor: Yes  IVF/IV medications: Not Applicable   Oxygen: How many liters 1L, Traced the line to wall oxygen, and No oxygen tank in room  Bedside sitter: Not Applicable   Isolation: Not applicable

## 2025-01-14 NOTE — PROGRESS NOTES
Bedside report received from Trent SALAS. Bed in lowest position, call light within reach, pt resting comfortably. Bed alarm on and connected properly. Pt educated to call before getting out of bed. No further needs at this time.

## 2025-01-14 NOTE — DISCHARGE PLANNING
Community Health Worker Intake    Identified barriers to homelessness.  Resources provided to such  [facility], [address], and [facility].  Contact information provided to Juan Carlos Sibley   Inpatient assessment completed.    CHDEDRA Wu introduced community care management to the patient.     Pt is homeless but receiving SSI/D.   Pt has a wheelchair and [facility] motor [facility].     Pt is not receiving SNAP, states that he does not qualify.   Pt utilizes the [facility] bus for transportation.     CHW educated the pt on housing resources including such a blessing. CHW called [place] and left a voicemail asking if she could call back so I could provide pt's room number and she could do a bedside interview. CHW advised the pt that drugs and alcohol will not be allowed on the property.  Pt states that its not a problem. He would rather have housing than drugs.      [facility] with [facility]. Pt will need to walk in or schedule his own appointment.     Plan: CHW will wait for a call back from [place] to see if she can interview the pt for possible housing.

## 2025-01-14 NOTE — CONSULTS
"ID brief note  ID consult for possible septic arthritis vs gout  S/p I and D 1/3  Per op note : immediate expression of cloudy fluid. There appeared to be gouty tophi intermixed. \"  Culture prelim neg  Full consult to follow if positive cultures  Continue Ancef for now-dc if cultures neg  "

## 2025-01-14 NOTE — DISCHARGE PLANNING
Care Transition Team Discharge Planning    Anticipated Discharge Information  Discharge Disposition: Discharged to home/self care (01)  Discharge Address: [address]  [place] [address]  Discharge Contact Phone Number: [phone]    Discharge Plan: Patient discussed in IDT rounds. Pending ID recs, echo and OT. RNCM to remain available for DCP needs.

## 2025-01-14 NOTE — ANESTHESIA TIME REPORT
Anesthesia Start and Stop Event Times       Date Time Event    1/13/2025 1521 Ready for Procedure     1530 Anesthesia Start     1619 Anesthesia Stop          Responsible Staff  01/13/25      Name Role Begin End    Tobias Trejo M.D. Anesth 1530 1619          Overtime Reason:  no overtime (within assigned shift)    Comments:

## 2025-01-14 NOTE — DISCHARGE PLANNING
Community Health Worker Intake    Identified barriers to homelessness  Inpatient assessment incomplete.    CHW Jazmin attempted o introduce community care management to the patient.   Pt states that the RN just gave him his medication and he would like to take a nap.   Pt requested CHW come back at a later time. Pt is hopeful I can assist with housing.     Plan: CHW will attempt again.

## 2025-01-15 ENCOUNTER — APPOINTMENT (OUTPATIENT)
Dept: CARDIOLOGY | Facility: MEDICAL CENTER | Age: 65
DRG: 513 | End: 2025-01-15
Attending: HOSPITALIST
Payer: COMMERCIAL

## 2025-01-15 LAB
ALBUMIN SERPL BCP-MCNC: 2.9 G/DL (ref 3.2–4.9)
BUN SERPL-MCNC: 20 MG/DL (ref 8–22)
CALCIUM ALBUM COR SERPL-MCNC: 9.2 MG/DL (ref 8.5–10.5)
CALCIUM SERPL-MCNC: 8.3 MG/DL (ref 8.5–10.5)
CHLORIDE SERPL-SCNC: 103 MMOL/L (ref 96–112)
CO2 SERPL-SCNC: 27 MMOL/L (ref 20–33)
CREAT SERPL-MCNC: 0.91 MG/DL (ref 0.5–1.4)
ERYTHROCYTE [DISTWIDTH] IN BLOOD BY AUTOMATED COUNT: 45.4 FL (ref 35.9–50)
GFR SERPLBLD CREATININE-BSD FMLA CKD-EPI: 93 ML/MIN/1.73 M 2
GLUCOSE SERPL-MCNC: 142 MG/DL (ref 65–99)
HCT VFR BLD AUTO: 37.6 % (ref 42–52)
HGB BLD-MCNC: 12.4 G/DL (ref 14–18)
LV EJECT FRACT  99904: 55
LV EJECT FRACT MOD 2C 99903: 54.06
LV EJECT FRACT MOD 4C 99902: 48.12
LV EJECT FRACT MOD BP 99901: 49.49
MCH RBC QN AUTO: 30.7 PG (ref 27–33)
MCHC RBC AUTO-ENTMCNC: 33 G/DL (ref 32.3–36.5)
MCV RBC AUTO: 93.1 FL (ref 81.4–97.8)
NT-PROBNP SERPL IA-MCNC: 203 PG/ML (ref 0–125)
PHOSPHATE SERPL-MCNC: 3.1 MG/DL (ref 2.5–4.5)
PLATELET # BLD AUTO: 261 K/UL (ref 164–446)
PMV BLD AUTO: 10.3 FL (ref 9–12.9)
POTASSIUM SERPL-SCNC: 4.3 MMOL/L (ref 3.6–5.5)
RBC # BLD AUTO: 4.04 M/UL (ref 4.7–6.1)
SODIUM SERPL-SCNC: 140 MMOL/L (ref 135–145)
WBC # BLD AUTO: 5.5 K/UL (ref 4.8–10.8)

## 2025-01-15 PROCEDURE — 99233 SBSQ HOSP IP/OBS HIGH 50: CPT | Performed by: HOSPITALIST

## 2025-01-15 PROCEDURE — 700102 HCHG RX REV CODE 250 W/ 637 OVERRIDE(OP): Performed by: STUDENT IN AN ORGANIZED HEALTH CARE EDUCATION/TRAINING PROGRAM

## 2025-01-15 PROCEDURE — 700111 HCHG RX REV CODE 636 W/ 250 OVERRIDE (IP): Mod: JZ | Performed by: STUDENT IN AN ORGANIZED HEALTH CARE EDUCATION/TRAINING PROGRAM

## 2025-01-15 PROCEDURE — 700105 HCHG RX REV CODE 258: Performed by: STUDENT IN AN ORGANIZED HEALTH CARE EDUCATION/TRAINING PROGRAM

## 2025-01-15 PROCEDURE — 700102 HCHG RX REV CODE 250 W/ 637 OVERRIDE(OP)

## 2025-01-15 PROCEDURE — 770001 HCHG ROOM/CARE - MED/SURG/GYN PRIV*

## 2025-01-15 PROCEDURE — 93306 TTE W/DOPPLER COMPLETE: CPT

## 2025-01-15 PROCEDURE — 80069 RENAL FUNCTION PANEL: CPT

## 2025-01-15 PROCEDURE — A9270 NON-COVERED ITEM OR SERVICE: HCPCS

## 2025-01-15 PROCEDURE — 97165 OT EVAL LOW COMPLEX 30 MIN: CPT

## 2025-01-15 PROCEDURE — 36415 COLL VENOUS BLD VENIPUNCTURE: CPT

## 2025-01-15 PROCEDURE — 85027 COMPLETE CBC AUTOMATED: CPT

## 2025-01-15 PROCEDURE — 83880 ASSAY OF NATRIURETIC PEPTIDE: CPT

## 2025-01-15 PROCEDURE — A9270 NON-COVERED ITEM OR SERVICE: HCPCS | Performed by: STUDENT IN AN ORGANIZED HEALTH CARE EDUCATION/TRAINING PROGRAM

## 2025-01-15 PROCEDURE — 93306 TTE W/DOPPLER COMPLETE: CPT | Mod: 26 | Performed by: INTERNAL MEDICINE

## 2025-01-15 PROCEDURE — 97535 SELF CARE MNGMENT TRAINING: CPT

## 2025-01-15 RX ADMIN — OXYCODONE HYDROCHLORIDE 10 MG: 10 TABLET ORAL at 05:07

## 2025-01-15 RX ADMIN — SPIRONOLACTONE 25 MG: 25 TABLET ORAL at 05:07

## 2025-01-15 RX ADMIN — OXYCODONE HYDROCHLORIDE 10 MG: 10 TABLET ORAL at 12:02

## 2025-01-15 RX ADMIN — TAMSULOSIN HYDROCHLORIDE 0.4 MG: 0.4 CAPSULE ORAL at 05:07

## 2025-01-15 RX ADMIN — CARVEDILOL 12.5 MG: 12.5 TABLET, FILM COATED ORAL at 17:18

## 2025-01-15 RX ADMIN — OXYCODONE HYDROCHLORIDE 10 MG: 10 TABLET ORAL at 23:55

## 2025-01-15 RX ADMIN — GABAPENTIN 300 MG: 300 CAPSULE ORAL at 05:07

## 2025-01-15 RX ADMIN — ENOXAPARIN SODIUM 40 MG: 100 INJECTION SUBCUTANEOUS at 17:18

## 2025-01-15 RX ADMIN — SPIRONOLACTONE 25 MG: 25 TABLET ORAL at 17:18

## 2025-01-15 RX ADMIN — OXYCODONE HYDROCHLORIDE 10 MG: 10 TABLET ORAL at 17:21

## 2025-01-15 RX ADMIN — ASPIRIN 81 MG: 81 TABLET, COATED ORAL at 05:07

## 2025-01-15 RX ADMIN — AMLODIPINE BESYLATE 5 MG: 5 TABLET ORAL at 05:07

## 2025-01-15 RX ADMIN — SERTRALINE HYDROCHLORIDE 100 MG: 100 TABLET, FILM COATED ORAL at 20:29

## 2025-01-15 RX ADMIN — GABAPENTIN 300 MG: 300 CAPSULE ORAL at 12:02

## 2025-01-15 RX ADMIN — CEFAZOLIN 2 G: 2 INJECTION, POWDER, FOR SOLUTION INTRAMUSCULAR; INTRAVENOUS at 05:10

## 2025-01-15 RX ADMIN — Medication 1 TABLET: at 05:07

## 2025-01-15 RX ADMIN — CARVEDILOL 12.5 MG: 12.5 TABLET, FILM COATED ORAL at 07:59

## 2025-01-15 RX ADMIN — OXYCODONE HYDROCHLORIDE 10 MG: 10 TABLET ORAL at 20:29

## 2025-01-15 RX ADMIN — GABAPENTIN 300 MG: 300 CAPSULE ORAL at 17:18

## 2025-01-15 RX ADMIN — FINASTERIDE 5 MG: 5 TABLET, FILM COATED ORAL at 05:07

## 2025-01-15 RX ADMIN — ROSUVASTATIN CALCIUM 40 MG: 20 TABLET, FILM COATED ORAL at 05:07

## 2025-01-15 RX ADMIN — SACUBITRIL AND VALSARTAN 1 TABLET: 49; 51 TABLET, FILM COATED ORAL at 05:07

## 2025-01-15 RX ADMIN — SACUBITRIL AND VALSARTAN 1 TABLET: 49; 51 TABLET, FILM COATED ORAL at 17:18

## 2025-01-15 ASSESSMENT — COGNITIVE AND FUNCTIONAL STATUS - GENERAL
SUGGESTED CMS G CODE MODIFIER DAILY ACTIVITY: CH
CLIMB 3 TO 5 STEPS WITH RAILING: A LITTLE
STANDING UP FROM CHAIR USING ARMS: A LITTLE
DAILY ACTIVITIY SCORE: 24
WALKING IN HOSPITAL ROOM: A LITTLE
DRESSING REGULAR LOWER BODY CLOTHING: A LITTLE
SUGGESTED CMS G CODE MODIFIER DAILY ACTIVITY: CI
DAILY ACTIVITIY SCORE: 23
SUGGESTED CMS G CODE MODIFIER MOBILITY: CJ
MOBILITY SCORE: 21

## 2025-01-15 ASSESSMENT — PAIN DESCRIPTION - PAIN TYPE
TYPE: ACUTE PAIN
TYPE: SURGICAL PAIN
TYPE: ACUTE PAIN

## 2025-01-15 ASSESSMENT — ENCOUNTER SYMPTOMS
COUGH: 0
FEVER: 0
DIZZINESS: 0
CHILLS: 0

## 2025-01-15 ASSESSMENT — ACTIVITIES OF DAILY LIVING (ADL): TOILETING: INDEPENDENT

## 2025-01-15 ASSESSMENT — FIBROSIS 4 INDEX: FIB4 SCORE: 1.58

## 2025-01-15 NOTE — CARE PLAN
The patient is Stable - Low risk of patient condition declining or worsening    Shift Goals  Clinical Goals: IV abx, mobility, wean O2  Patient Goals: pain control  Family Goals: jason    Progress made toward(s) clinical / shift goals:    Problem: Pain - Standard  Goal: Alleviation of pain or a reduction in pain to the patient’s comfort goal  Outcome: Progressing     Problem: Knowledge Deficit - Standard  Goal: Patient and family/care givers will demonstrate understanding of plan of care, disease process/condition, diagnostic tests and medications  Outcome: Progressing     Problem: Hemodynamics  Goal: Patient's hemodynamics, fluid balance and neurologic status will be stable or improve  Outcome: Progressing     Problem: Physical Regulation  Goal: Diagnostic test results will improve  Outcome: Progressing  Goal: Signs and symptoms of infection will decrease  Outcome: Progressing     Problem: Fall Risk  Goal: Patient will remain free from falls  Outcome: Progressing     Problem: Skin Integrity  Goal: Skin integrity is maintained or improved  Outcome: Progressing

## 2025-01-15 NOTE — PROGRESS NOTES
Bedside report received from off going RN: Trent assumed care of patient.     Fall Risk Score: MODERATE RISK  Fall risk interventions in place: Provide patient/family education based on risk assessment, Educate patient/family to call staff for assistance when getting out of bed, Place fall precaution signage outside patient door, Utilize bed/chair fall alarm, and Bed alarm connected correctly  Bed type: Regular (Brian Score less than 17 interventions in place)  Patient on cardiac monitor: Yes  IVF/IV medications: Not Applicable   Oxygen: How many liters 0.5L and Traced the line to wall oxygen  Bedside sitter: Not Applicable   Isolation: Not applicable

## 2025-01-15 NOTE — CARE PLAN
Problem: Pain - Standard  Goal: Alleviation of pain or a reduction in pain to the patient’s comfort goal  Outcome: Progressing     Problem: Knowledge Deficit - Standard  Goal: Patient and family/care givers will demonstrate understanding of plan of care, disease process/condition, diagnostic tests and medications  Outcome: Progressing     Problem: Hemodynamics  Goal: Patient's hemodynamics, fluid balance and neurologic status will be stable or improve  Outcome: Progressing     Problem: Fall Risk  Goal: Patient will remain free from falls  Outcome: Progressing     Problem: Skin Integrity  Goal: Skin integrity is maintained or improved  Outcome: Progressing   The patient is Stable - Low risk of patient condition declining or worsening    Shift Goals  Clinical Goals: ABX  Patient Goals: pain control  Family Goals: jason    Progress made toward(s) clinical / shift goals:  progressing    Patient is not progressing towards the following goals:

## 2025-01-15 NOTE — PROGRESS NOTES
Bedside report received from day RN, pt care assumed, assessment completed. Pt is A&O4, pain 8/10, not on the monitor. Updated on POC, questions answered. Bed in lowest, locked position, treaded socks on, call light and belongings within reach. Fall precautions in place.      Fall Risk Score: MODERATE RISK  Fall risk interventions in place: Provide patient/family education based on risk assessment, Educate patient/family to call staff for assistance when getting out of bed, Place fall precaution signage outside patient door, Utilize bed/chair fall alarm, and Bed alarm connected correctly  Bed type: Regular (Brian Score less than 17 interventions in place)  Patient on cardiac monitor: No   IVF/IV medications: Not Applicable   Oxygen: How many liters 0.5L, Traced the line to wall oxygen, and No oxygen tank in room  Bedside sitter: Not Applicable   Isolation: Not applicable

## 2025-01-15 NOTE — ASSESSMENT & PLAN NOTE
Complaining of right wrist pain with swelling for the past 2 days  Associated with chills, and pain radiates to the elbow and right jaw  Status post arthrocentesis in ED  Plain film showed osteoarthritis and dorsum soft tissue swelling  US venous with DVT  uric acid unremarkable  Received IV Ancef  Status post ortho I&D  ID Dr. Alonso consulted  Cultures negative, DC'd antibiotics  Now treating for gout with colchicine started on 1/16

## 2025-01-15 NOTE — PROGRESS NOTES
Hospital Medicine Daily Progress Note    Date of Service  1/14/2025    Chief Complaint  Juan Carlos Sibley is a 64 y.o. male admitted 1/12/2025 with     Hospital Course  No notes on file    Interval Problem Update  1/13 Has acute right wrist pain and swelling that is still very painful to  Plan for OR today with Ortho  1/14 still with significant wrist pain although patient feels he is healing.  Consulted ID Dr. Alonso.  Echo pending.  Total time 51 minutes.    I have discussed this patient's plan of care and discharge plan at IDT rounds today with Case Management, Nursing, Nursing leadership, and other members of the IDT team.    Disposition  The patient is not medically cleared for discharge to home or a post-acute facility.      I have placed the appropriate orders for post-discharge needs.    Review of Systems  Review of Systems   Constitutional:  Negative for chills and fever.   Respiratory:  Negative for cough.    Cardiovascular:  Negative for chest pain.   Genitourinary:  Negative for dysuria.   Musculoskeletal:  Positive for joint pain.        Physical Exam  Temp:  [35.9 °C (96.7 °F)-37 °C (98.6 °F)] 36.8 °C (98.2 °F)  Pulse:  [63-73] 73  Resp:  [9-17] 17  BP: (125-160)/(75-97) 132/91  SpO2:  [88 %-100 %] 92 %    Physical Exam  Constitutional:       General: He is not in acute distress.     Appearance: He is not toxic-appearing.   HENT:      Head: Normocephalic and atraumatic.      Mouth/Throat:      Mouth: Mucous membranes are moist.   Eyes:      Extraocular Movements: Extraocular movements intact.   Cardiovascular:      Rate and Rhythm: Normal rate.      Pulses: Normal pulses.   Pulmonary:      Effort: Pulmonary effort is normal.   Abdominal:      Palpations: Abdomen is soft.   Musculoskeletal:      Comments: Right wrist swollen and tender   Skin:     General: Skin is warm and dry.         Fluids    Intake/Output Summary (Last 24 hours) at 1/14/2025 1600  Last data filed at 1/14/2025 0341  Gross per 24 hour    Intake 740 ml   Output 635 ml   Net 105 ml        Laboratory  Recent Labs     01/12/25  1258 01/13/25  0105   WBC 13.3* 7.7   RBC 4.64* 4.17*   HEMOGLOBIN 14.0 12.7*   HEMATOCRIT 43.1 38.3*   MCV 92.9 91.8   MCH 30.2 30.5   MCHC 32.5 33.2   RDW 46.3 46.5   PLATELETCT 314 260   MPV 9.8 10.2     Recent Labs     01/12/25  1258 01/13/25  0105   SODIUM 139 137   POTASSIUM 3.3* 4.4   CHLORIDE 103 103   CO2 27 25   GLUCOSE 81 118*   BUN 16 16   CREATININE 1.08 1.08   CALCIUM 9.3 8.6             Recent Labs     01/12/25  1507   TRIGLYCERIDE 144   HDL 31*   LDL 94       Imaging  US-RENAL   Final Result      1.  Moderate left hydronephrosis and hydroureter.   2.  Debris in the bladder lumen with a large posterior bladder diverticulum measuring 12.5 x 8.5 x 5.3 cm in diameter.   3.  Bladder volume is 590 mL.   4.  Bilateral medical renal disease.   5.  Incidentally noted is a simple cyst in the right lobe of the liver measuring 4 x 4 0.2 x 3.2 cm in diameter.      US-EXTREMITY ARTERY UPPER UNILAT RIGHT   Final Result      CT-CTA COMPLETE THORACOABDOMINAL AORTA   Final Result      1.  No thoracic or abdominal aortic aneurysm or dissection.   2.  Mild bilateral dependent atelectasis.   3.  LEFT hydronephrosis with possible stone in the distal ureter although ureters not completely imaged.  Distal LEFT ureter displaced medially by large bladder diverticulum.                           DX-WRIST-LIMITED 2- RIGHT   Final Result      1.  No fracture or dislocation of RIGHT wrist.   2.  Moderate osteoarthritis   3.  Dorsal soft tissue swelling.      DX-CHEST-PORTABLE (1 VIEW)   Final Result      No acute cardiopulmonary disease.      EC-ECHOCARDIOGRAM COMPLETE W/O CONT    (Results Pending)        Assessment/Plan  Acute hypoxic respiratory failure (HCC)  Assessment & Plan  CTA thoracoabdominal aorta showed mild bilateral dependent atelectasis   proBNP ~900, patient appears euvolemic  Chest x-ray with no acute pathology  Will check for  COVID-19  Incentive spirometry  Wean oxygen as tolerated      Leukocytosis  Assessment & Plan  Secondary to right wrist infection versus inflammation    Hypokalemia  Assessment & Plan  Replating as needed    Wrist pain, acute, right- (present on admission)  Assessment & Plan  Complaining of right wrist pain with swelling for the past 2 days  Associated with chills, and pain radiates to the elbow and right jaw  Status post arthrocentesis in ED  Plain film showed osteoarthritis and dorsum soft tissue swelling  US venous with DVT  uric acid unremarkable  Received IV Ancef  Status post ortho I&D  ID Dr. Alonso consulted  Antibiotics per ID    Advance care planning  Assessment & Plan  Full code    Homeless  Assessment & Plan  Reports he is homeless     Hydronephrosis  Assessment & Plan  Incidental finding on CT imaging  LEFT hydronephrosis with possible stone in the distal ureter although ureters not completely imaged.  Distal LEFT ureter displaced medially by large bladder diverticulum  Normal renal function   Ultrasound also shows moderate left hydroureter and hydronephrosis        Drug abuse (HCC)  Assessment & Plan  Report intermittently uses meth  Last use was 24 hours ago prior to admission  Patient states that he has been smoking meth  Urine positive for amphetamine      Hypertensive urgency  Assessment & Plan  Continue home medication   IV BP meds with parameters   Continue closely monitoring    Chronic combined systolic and diastolic heart failure (EF 40%)- (present on admission)  Assessment & Plan  Hx of   proBNP in   Patient is euvolemic  Continues Aldactone, Entresto amlodipine, and losartan, and Coreg  Repeat echo pending      Chest pain- (present on admission)  Assessment & Plan  Complaining substernal chest pain and has been radiating to the right jaw and arm pain  Troponin negative in ED  proBNP is mildly elevated  EKG showed Sinus rhythm VCD, consider atypical RBBB  LVH with IVCD, LAD and secondary  repol abnrm   Will trend troponin   Lipid low HDL, TSH wnl, A1c 5.8%  Echo ordered   Monitor on tele            VTE prophylaxis: enoxaparin    I have performed a physical exam and reviewed and updated ROS and Plan today (1/14/2025). In review of yesterday's note (1/13/2025), there are no changes except as documented above.

## 2025-01-16 LAB
ALBUMIN SERPL BCP-MCNC: 2.9 G/DL (ref 3.2–4.9)
BACTERIA FLD AEROBE CULT: NORMAL
BACTERIA WND AEROBE CULT: NORMAL
BUN SERPL-MCNC: 23 MG/DL (ref 8–22)
CALCIUM ALBUM COR SERPL-MCNC: 9.6 MG/DL (ref 8.5–10.5)
CALCIUM SERPL-MCNC: 8.7 MG/DL (ref 8.5–10.5)
CHLORIDE SERPL-SCNC: 103 MMOL/L (ref 96–112)
CO2 SERPL-SCNC: 27 MMOL/L (ref 20–33)
CREAT SERPL-MCNC: 1.07 MG/DL (ref 0.5–1.4)
ERYTHROCYTE [DISTWIDTH] IN BLOOD BY AUTOMATED COUNT: 44 FL (ref 35.9–50)
GFR SERPLBLD CREATININE-BSD FMLA CKD-EPI: 77 ML/MIN/1.73 M 2
GLUCOSE SERPL-MCNC: 116 MG/DL (ref 65–99)
GRAM STN SPEC: NORMAL
GRAM STN SPEC: NORMAL
HCT VFR BLD AUTO: 36.8 % (ref 42–52)
HGB BLD-MCNC: 12.3 G/DL (ref 14–18)
MCH RBC QN AUTO: 30.4 PG (ref 27–33)
MCHC RBC AUTO-ENTMCNC: 33.4 G/DL (ref 32.3–36.5)
MCV RBC AUTO: 91.1 FL (ref 81.4–97.8)
PHOSPHATE SERPL-MCNC: 3.4 MG/DL (ref 2.5–4.5)
PLATELET # BLD AUTO: 284 K/UL (ref 164–446)
PMV BLD AUTO: 10.1 FL (ref 9–12.9)
POTASSIUM SERPL-SCNC: 4.2 MMOL/L (ref 3.6–5.5)
RBC # BLD AUTO: 4.04 M/UL (ref 4.7–6.1)
SIGNIFICANT IND 70042: NORMAL
SIGNIFICANT IND 70042: NORMAL
SITE SITE: NORMAL
SITE SITE: NORMAL
SODIUM SERPL-SCNC: 137 MMOL/L (ref 135–145)
SOURCE SOURCE: NORMAL
SOURCE SOURCE: NORMAL
WBC # BLD AUTO: 6.4 K/UL (ref 4.8–10.8)

## 2025-01-16 PROCEDURE — 99233 SBSQ HOSP IP/OBS HIGH 50: CPT | Performed by: HOSPITALIST

## 2025-01-16 PROCEDURE — 85027 COMPLETE CBC AUTOMATED: CPT

## 2025-01-16 PROCEDURE — A9270 NON-COVERED ITEM OR SERVICE: HCPCS | Performed by: HOSPITALIST

## 2025-01-16 PROCEDURE — A9270 NON-COVERED ITEM OR SERVICE: HCPCS

## 2025-01-16 PROCEDURE — 36415 COLL VENOUS BLD VENIPUNCTURE: CPT

## 2025-01-16 PROCEDURE — 700102 HCHG RX REV CODE 250 W/ 637 OVERRIDE(OP)

## 2025-01-16 PROCEDURE — 700102 HCHG RX REV CODE 250 W/ 637 OVERRIDE(OP): Performed by: HOSPITALIST

## 2025-01-16 PROCEDURE — 700111 HCHG RX REV CODE 636 W/ 250 OVERRIDE (IP): Mod: JZ | Performed by: STUDENT IN AN ORGANIZED HEALTH CARE EDUCATION/TRAINING PROGRAM

## 2025-01-16 PROCEDURE — 770006 HCHG ROOM/CARE - MED/SURG/GYN SEMI*

## 2025-01-16 PROCEDURE — 80069 RENAL FUNCTION PANEL: CPT

## 2025-01-16 PROCEDURE — 700102 HCHG RX REV CODE 250 W/ 637 OVERRIDE(OP): Performed by: STUDENT IN AN ORGANIZED HEALTH CARE EDUCATION/TRAINING PROGRAM

## 2025-01-16 PROCEDURE — A9270 NON-COVERED ITEM OR SERVICE: HCPCS | Performed by: STUDENT IN AN ORGANIZED HEALTH CARE EDUCATION/TRAINING PROGRAM

## 2025-01-16 RX ORDER — COLCHICINE 0.6 MG/1
0.6 TABLET ORAL DAILY
Status: DISCONTINUED | OUTPATIENT
Start: 2025-01-16 | End: 2025-01-17 | Stop reason: HOSPADM

## 2025-01-16 RX ORDER — COLCHICINE 0.6 MG/1
1.2 TABLET ORAL ONCE
Status: COMPLETED | OUTPATIENT
Start: 2025-01-16 | End: 2025-01-16

## 2025-01-16 RX ADMIN — COLCHICINE 1.2 MG: 0.6 TABLET, FILM COATED ORAL at 12:30

## 2025-01-16 RX ADMIN — TAMSULOSIN HYDROCHLORIDE 0.4 MG: 0.4 CAPSULE ORAL at 04:19

## 2025-01-16 RX ADMIN — OXYCODONE HYDROCHLORIDE 10 MG: 10 TABLET ORAL at 08:12

## 2025-01-16 RX ADMIN — SPIRONOLACTONE 25 MG: 25 TABLET ORAL at 04:19

## 2025-01-16 RX ADMIN — SPIRONOLACTONE 25 MG: 25 TABLET ORAL at 17:05

## 2025-01-16 RX ADMIN — CARVEDILOL 12.5 MG: 12.5 TABLET, FILM COATED ORAL at 17:05

## 2025-01-16 RX ADMIN — GABAPENTIN 300 MG: 300 CAPSULE ORAL at 17:05

## 2025-01-16 RX ADMIN — AMLODIPINE BESYLATE 5 MG: 5 TABLET ORAL at 04:19

## 2025-01-16 RX ADMIN — CARVEDILOL 12.5 MG: 12.5 TABLET, FILM COATED ORAL at 08:13

## 2025-01-16 RX ADMIN — ENOXAPARIN SODIUM 40 MG: 100 INJECTION SUBCUTANEOUS at 17:05

## 2025-01-16 RX ADMIN — OXYCODONE HYDROCHLORIDE 10 MG: 10 TABLET ORAL at 04:19

## 2025-01-16 RX ADMIN — OXYCODONE HYDROCHLORIDE 10 MG: 10 TABLET ORAL at 12:27

## 2025-01-16 RX ADMIN — SACUBITRIL AND VALSARTAN 1 TABLET: 49; 51 TABLET, FILM COATED ORAL at 17:06

## 2025-01-16 RX ADMIN — COLCHICINE 0.6 MG: 0.6 TABLET, FILM COATED ORAL at 17:04

## 2025-01-16 RX ADMIN — SERTRALINE HYDROCHLORIDE 100 MG: 100 TABLET, FILM COATED ORAL at 20:10

## 2025-01-16 RX ADMIN — ASPIRIN 81 MG: 81 TABLET, COATED ORAL at 04:20

## 2025-01-16 RX ADMIN — OXYCODONE HYDROCHLORIDE 10 MG: 10 TABLET ORAL at 20:10

## 2025-01-16 RX ADMIN — SACUBITRIL AND VALSARTAN 1 TABLET: 49; 51 TABLET, FILM COATED ORAL at 04:18

## 2025-01-16 RX ADMIN — GABAPENTIN 300 MG: 300 CAPSULE ORAL at 11:55

## 2025-01-16 RX ADMIN — ROSUVASTATIN CALCIUM 40 MG: 20 TABLET, FILM COATED ORAL at 04:19

## 2025-01-16 RX ADMIN — OXYCODONE HYDROCHLORIDE 10 MG: 10 TABLET ORAL at 17:05

## 2025-01-16 RX ADMIN — Medication 1 TABLET: at 04:19

## 2025-01-16 RX ADMIN — FINASTERIDE 5 MG: 5 TABLET, FILM COATED ORAL at 04:19

## 2025-01-16 RX ADMIN — GABAPENTIN 300 MG: 300 CAPSULE ORAL at 04:19

## 2025-01-16 ASSESSMENT — ENCOUNTER SYMPTOMS
SORE THROAT: 0
CHILLS: 0
FEVER: 0
COUGH: 0
SPUTUM PRODUCTION: 0
DIZZINESS: 0

## 2025-01-16 ASSESSMENT — PAIN DESCRIPTION - PAIN TYPE
TYPE: ACUTE PAIN

## 2025-01-16 ASSESSMENT — FIBROSIS 4 INDEX: FIB4 SCORE: 1.45

## 2025-01-16 NOTE — PROGRESS NOTES
Hospital Medicine Daily Progress Note    Date of Service  1/16/2025    Chief Complaint  Juan Carlos Sibley is a 64 y.o. male admitted 1/12/2025 with     Hospital Course  No notes on file    Interval Problem Update  1/13 Has acute right wrist pain and swelling that is still very painful to  Plan for OR today with Ortho  1/14 still with significant wrist pain although patient feels he is healing.  Consulted ID Dr. Alonso.  Echo pending.   1/15: Still having significant wrist pain.  Continuing pain regimen.  No bacterial growth on this culture yet.   1/16: Cultures now negative for 48 hours.  Still having right hand pain.  Started on colchicine.  Transferred to medical.  Total time 52 minutes.    I have discussed this patient's plan of care and discharge plan at IDT rounds today with Case Management, Nursing, Nursing leadership, and other members of the IDT team.    Disposition  The patient is not medically cleared for discharge to home or a post-acute facility.      I have placed the appropriate orders for post-discharge needs.    Review of Systems  Review of Systems   Constitutional:  Negative for chills and fever.   HENT:  Negative for sore throat.    Respiratory:  Negative for cough and sputum production.    Cardiovascular:  Negative for chest pain.   Genitourinary:  Negative for dysuria.   Musculoskeletal:  Positive for joint pain.   Neurological:  Negative for dizziness.        Physical Exam  Temp:  [36.2 °C (97.2 °F)-37 °C (98.6 °F)] 36.2 °C (97.2 °F)  Pulse:  [62-75] 62  Resp:  [16-18] 16  BP: (135-184)/() 135/99  SpO2:  [91 %-94 %] 91 %    Physical Exam  Constitutional:       General: He is not in acute distress.     Appearance: He is well-developed. He is not toxic-appearing.   HENT:      Head: Normocephalic and atraumatic.      Mouth/Throat:      Mouth: Mucous membranes are moist.   Cardiovascular:      Rate and Rhythm: Normal rate.      Heart sounds:      No gallop.   Pulmonary:      Effort: Pulmonary  effort is normal. No respiratory distress.      Breath sounds: No wheezing.   Abdominal:      General: There is no distension.      Palpations: Abdomen is soft.      Tenderness: There is no abdominal tenderness.   Musculoskeletal:      Comments: Right wrist swollen and tender   Skin:     General: Skin is warm and dry.   Neurological:      Mental Status: He is alert. Mental status is at baseline.         Fluids    Intake/Output Summary (Last 24 hours) at 1/16/2025 1552  Last data filed at 1/16/2025 0812  Gross per 24 hour   Intake 600 ml   Output 2545 ml   Net -1945 ml        Laboratory  Recent Labs     01/15/25  0128 01/16/25  0140   WBC 5.5 6.4   RBC 4.04* 4.04*   HEMOGLOBIN 12.4* 12.3*   HEMATOCRIT 37.6* 36.8*   MCV 93.1 91.1   MCH 30.7 30.4   MCHC 33.0 33.4   RDW 45.4 44.0   PLATELETCT 261 284   MPV 10.3 10.1     Recent Labs     01/15/25  0128 01/16/25  0140   SODIUM 140 137   POTASSIUM 4.3 4.2   CHLORIDE 103 103   CO2 27 27   GLUCOSE 142* 116*   BUN 20 23*   CREATININE 0.91 1.07   CALCIUM 8.3* 8.7                     Imaging  EC-ECHOCARDIOGRAM COMPLETE W/O CONT   Final Result      US-RENAL   Final Result      1.  Moderate left hydronephrosis and hydroureter.   2.  Debris in the bladder lumen with a large posterior bladder diverticulum measuring 12.5 x 8.5 x 5.3 cm in diameter.   3.  Bladder volume is 590 mL.   4.  Bilateral medical renal disease.   5.  Incidentally noted is a simple cyst in the right lobe of the liver measuring 4 x 4 0.2 x 3.2 cm in diameter.      US-EXTREMITY ARTERY UPPER UNILAT RIGHT   Final Result      CT-CTA COMPLETE THORACOABDOMINAL AORTA   Final Result      1.  No thoracic or abdominal aortic aneurysm or dissection.   2.  Mild bilateral dependent atelectasis.   3.  LEFT hydronephrosis with possible stone in the distal ureter although ureters not completely imaged.  Distal LEFT ureter displaced medially by large bladder diverticulum.                           DX-WRIST-LIMITED 2- RIGHT    Final Result      1.  No fracture or dislocation of RIGHT wrist.   2.  Moderate osteoarthritis   3.  Dorsal soft tissue swelling.      DX-CHEST-PORTABLE (1 VIEW)   Final Result      No acute cardiopulmonary disease.           Assessment/Plan  Acute hypoxic respiratory failure (HCC)  Assessment & Plan  CTA thoracoabdominal aorta showed mild bilateral dependent atelectasis   proBNP ~900, patient appears euvolemic  Chest x-ray with no acute pathology  Will check for COVID-19  Incentive spirometry  Wean oxygen as tolerated      Leukocytosis  Assessment & Plan  Secondary to right wrist infection versus inflammation    Hypokalemia  Assessment & Plan  Replating as needed    Wrist pain, acute, right- (present on admission)  Assessment & Plan  Complaining of right wrist pain with swelling for the past 2 days  Associated with chills, and pain radiates to the elbow and right jaw  Status post arthrocentesis in ED  Plain film showed osteoarthritis and dorsum soft tissue swelling  US venous with DVT  uric acid unremarkable  Received IV Ancef  Status post ortho I&D  ID Dr. Alonso consulted  Cultures negative, DC'd antibiotics  Now treating for gout with colchicine started on 1/16    Advance care planning  Assessment & Plan  Full code    Homeless  Assessment & Plan  Reports he is homeless     Hydronephrosis  Assessment & Plan  Incidental finding on CT imaging  LEFT hydronephrosis with possible stone in the distal ureter although ureters not completely imaged.  Distal LEFT ureter displaced medially by large bladder diverticulum  Normal renal function   Ultrasound also shows moderate left hydroureter and hydronephrosis        Drug abuse (HCC)  Assessment & Plan  Report intermittently uses meth  Last use was 24 hours ago prior to admission  Patient states that he has been smoking meth  Urine positive for amphetamine      Hypertensive urgency  Assessment & Plan  Continue home medication   IV BP meds with parameters   Continue closely  monitoring    Chronic combined systolic and diastolic heart failure (EF 40%)- (present on admission)  Assessment & Plan  Hx of   proBNP in   Patient is euvolemic  Continues Aldactone, Entresto amlodipine, and losartan, and Coreg  Repeat echo unremarkable with a EF of 55% this visit      Chest pain- (present on admission)  Assessment & Plan  Complaining substernal chest pain and has been radiating to the right jaw and arm pain  Troponin negative in ED  proBNP is mildly elevated  EKG showed Sinus rhythm VCD, consider atypical RBBB  LVH with IVCD, LAD and secondary repol abnrm   Will trend troponin   Lipid low HDL, TSH wnl, A1c 5.8%  Echo unremarkable  Monitor on tele            VTE prophylaxis: enoxaparin    I have performed a physical exam and reviewed and updated ROS and Plan today (1/16/2025). In review of yesterday's note (1/15/2025), there are no changes except as documented above.

## 2025-01-16 NOTE — PROGRESS NOTES
"      Orthopaedic Progress Note    Interval changes:  Patient doing well    R wrist dressings are CDI  Cultures with NGTD at 48 hours     ROS - Patient denies any new issues.  Pain well controlled.    BP (!) 151/98   Pulse 65   Temp 36.7 °C (98 °F) (Temporal)   Resp 18   Ht 1.956 m (6' 5\")   Wt 96.3 kg (212 lb 4.9 oz)   SpO2 92%     Patient seen and examined  No acute distress  Breathing non labored  RRR  R wrist dressings CDI, DNVI, moves all fingers, cap refill <2 sec.     Recent Labs     01/13/25  0105 01/15/25  0128   WBC 7.7 5.5   RBC 4.17* 4.04*   HEMOGLOBIN 12.7* 12.4*   HEMATOCRIT 38.3* 37.6*   MCV 91.8 93.1   MCH 30.5 30.7   MCHC 33.2 33.0   RDW 46.5 45.4   PLATELETCT 260 261   MPV 10.2 10.3       Active Hospital Problems    Diagnosis     Hypertensive urgency [I16.0]     Drug abuse (HCC) [F19.10]     Hydronephrosis [N13.30]     Homeless [Z59.00]     Advance care planning [Z71.89]     Wrist pain, acute, right [M25.531]     Hypokalemia [E87.6]     Leukocytosis [D72.829]     Acute hypoxic respiratory failure (HCC) [J96.01]     Chronic combined systolic and diastolic heart failure (EF 40%) [I50.42]     Chest pain [R07.9]        Assessment/Plan:  Patient doing well    R wrist dressings are CDI  Cultures with NGTD at 48 hours  POD#2 Incision and drainage right wrist   Wt bearing status - light WBAT until incision heals  Wound care/Drains - Dressings to be changed every other day by nursing. Or PRN for saturation starting POD#2  Future Procedures - none planned   Lovenox: Start 1/12, Duration-until ambulatory > 150'  Sutures/Staples out- 14-21 days post operatively. Removal will completed by ortho mid levels only.  PT/OT-initiated  Antibiotics: completed   DVT Prophylaxis- TEDS/SCDs/Foot pumps  Loaiza-not needed per ortho  Case Coordination for Discharge Planning - Disposition per therapy recs.    "

## 2025-01-16 NOTE — THERAPY
"Occupational Therapy   Initial Evaluation     Patient Name: Juan Carlos Sibley  Age:  64 y.o., Sex:  male  Medical Record #: [id number]  Today's Date: 1/15/2025       Precautions: Able to Complete Non Weighted Activities of Daily Living  Comments: light WBAT RUE until incision heals    Assessment  Patient is 64 y.o. male who presents with right wrist pain and concern for right wrist septic arthritis.  Pt is s/p I&D right wrist 1/13/25.  Pt has PMHx of of HTN, PTSD & meth.  Pt seen today & he requested a shower.  Pt's right hand & IV was covered prior to shower.  Pt amb with SBA to the bathroom & he was supervised for toilet transfer.  Pt then transferred to shower bench where he completed a seated shower with extra time & supervision.  Pt primarily uses his W/C for mobility.  Pt instructed to keep RUE dry & perform gentle ROM with limited WB through his hand.  Pt is currently limited by his pain.  Advised pt follow up with out pt OT after D/C.  Recommend outpatient occupational therapy services to address higher level deficits.    Plan    Occupational Therapy Initial Treatment Plan   Duration: Evaluation only    DC Equipment Recommendations: None  Discharge Recommendations: Recommend outpatient occupational therapy services to address higher level deficits     Subjective    \"That shower felt great\"     Objective      Initial Contact Note    Initial Contact Note Order Received and Verified, Evaluation Only - Patient Does Not Require Further Acute Occupational Therapy at this Time.  However, May Benefit from Post Acute Therapy for Higher Level Functional Deficits.   Prior Living Situation   Prior Services None   Housing / Facility Homeless;[employer]  (pt not a reliable historian)   Equipment Owned Wheelchair   Lives with - Patient's Self Care Capacity Alone and Able to Care For Self   Prior Level of ADL Function   Self Feeding Independent   Grooming / Hygiene Independent   Bathing Independent   Dressing Independent   Toileting " Independent   Prior Level of IADL Function   Medication Management Independent   Laundry Independent   Kitchen Mobility Independent   Finances Independent   Home Management Independent   Shopping Independent   Prior Level Of Mobility Uses Wheel Chair for Community Mobility   Precautions   Precautions Able to Complete Non Weighted Activities of Daily Living   Comments light WBAT until incision heals   Vitals   O2 Delivery Device None - Room Air   Pain   Pain Scales 0 to 10 Scale    Intervention Ambulation / Increased Activity   Pain 0 - 10 Group   Location Hand;Wrist   Location Orientation Right   Description Sore;Tender   Therapist Pain Assessment During Activity;Nurse Notified;4   Cognition    Cognition / Consciousness WDL   Level of Consciousness Alert   Comments pt with odd affect, pleasant, co-operative   Passive ROM Upper Body   Passive ROM Upper Body Not Tested   Comments pt with post op dressing over right hand/wrist   Active ROM Upper Body   Active ROM Upper Body  X   Dominant Hand Right   Rt Wrist Extension Degrees 5   Rt Hand Moderate Impaired   Comments limited by pain   Strength Upper Body   Upper Body Strength  Not Tested   Comments LUE is WFL   Sensation Upper Body   Upper Extremity Sensation  WDL   Coordination Upper Body   Coordination X   Fine Motor Coordination impaired right hand   Balance Assessment   Sitting Balance (Static) Good   Sitting Balance (Dynamic) Fair +   Standing Balance (Static) Fair   Standing Balance (Dynamic) Fair   Weight Shift Sitting Good   Weight Shift Standing Fair   Comments pt has old left ankle injury with deformity   Bed Mobility    Supine to Sit Supervised   Sit to Supine Supervised   Scooting Supervised   Rolling Modified Independent   ADL Assessment   Eating Modified Independent   Grooming Supervision;Seated   Bathing Supervision   Upper Body Dressing Supervision   Lower Body Dressing Supervision   Toileting Supervision   Comments pt performed seated showered with  supervision   Functional Mobility   Sit to Stand Supervised   Bed, Chair, Wheelchair Transfer Supervised   Toilet Transfers Supervised   Tub / Shower Transfers Supervised   Transfer Method Stand Step   Mobility pt amb with SBA to bathroom   Comments pt is able to amb short distance but uses his W/C primarily   Edema / Skin Assessment   Edema / Skin  X   Right Upper Extremity Edema 2+ Pitting   Comments right hand in post op dressing   Activity Tolerance   Sitting in Chair 45   Sitting Edge of Bed 5   Standing 5   Education Group   Education Provided Transfers;Activities of Daily Living;Upper Extremity Range of Motion   Role of Occupational Therapist Patient Response Patient;Acceptance;Explanation;Verbal Demonstration   Upper Ext ROM Patient Response Patient;Acceptance;Explanation;Demonstration;Verbal Demonstration;Action Demonstration   Transfers Patient Response Patient;Acceptance;Explanation;Demonstration;Verbal Demonstration;Action Demonstration   ADL Patient Response Patient;Acceptance;Explanation;Demonstration;Verbal Demonstration;Action Demonstration   Occupational Therapy Initial Treatment Plan    Duration Evaluation only   Anticipated Discharge Equipment and Recommendations   DC Equipment Recommendations None   Discharge Recommendations Recommend outpatient occupational therapy services to address higher level deficits   Interdisciplinary Plan of Care Collaboration   IDT Collaboration with  Nursing   Patient Position at End of Therapy Seated;Chair Alarm On;Call Light within Reach;Tray Table within Reach;Phone within Reach   Collaboration Comments Nsg notfiied of OT findings   Session Information   Date / Session Number  1/15- eval only

## 2025-01-16 NOTE — PROGRESS NOTES
Bedside report received from off going RN/tech: Vilma, assumed care of patient.     Fall Risk Score: MODERATE RISK  Fall risk interventions in place: Provide patient/family education based on risk assessment, Educate patient/family to call staff for assistance when getting out of bed, Utilize bed/chair fall alarm, and Bed alarm connected correctly  Bed type: Regular (Brian Score less than 17 interventions in place)  Patient on cardiac monitor: No   IVF/IV medications: Not Applicable   Oxygen: Room Air  Bedside sitter: Not Applicable   Isolation: Not applicable

## 2025-01-16 NOTE — CARE PLAN
The patient is Stable - Low risk of patient condition declining or worsening    Shift Goals  Clinical Goals: Pain control, wound care  Patient Goals: pain control  Family Goals: LENNY    Progress made toward(s) clinical / shift goals:    Problem: Pain - Standard  Goal: Alleviation of pain or a reduction in pain to the patient’s comfort goal  Description: Target End Date:  Prior to discharge or change in level of care    Document on Vitals flowsheet    1.  Document pain using the appropriate pain scale per order or unit policy  2.  Educate and implement non-pharmacologic comfort measures (i.e. relaxation, distraction, massage, cold/heat therapy, etc.)  3.  Pain management medications as ordered  4.  Reassess pain after pain med administration per policy  5.  If opiods administered assess patient's response to pain medication is appropriate per POSS sedation scale  6.  Follow pain management plan developed in collaboration with patient and interdisciplinary team (including palliative care or pain specialists if applicable)  Outcome: Progressing  Note: Pt reports pain well controlled with current therapy. Additional non-pharmacologic interventions implemented. Education on pain reduction goals, pain rating scale, and potential side effects of pharmacologic interventions. Demonstrates use of appropriate diversional activities and/or relaxation techniques.      Problem: Knowledge Deficit - Standard  Goal: Patient and family/care givers will demonstrate understanding of plan of care, disease process/condition, diagnostic tests and medications  Description: Target End Date:  1-3 days or as soon as patient condition allows    Document in Patient Education    1.  Patient and family/caregiver oriented to unit, equipment, visitation policy and means for communicating concern  2.  Complete/review Learning Assessment  3.  Assess knowledge level of disease process/condition, treatment plan, diagnostic tests and medications  4.   Explain disease process/condition, treatment plan, diagnostic tests and medications  Outcome: Progressing  Note: Pt updated on POC       Patient is not progressing towards the following goals:

## 2025-01-16 NOTE — CARE PLAN
The patient is Stable - Low risk of patient condition declining or worsening    Shift Goals  Clinical Goals: pain management, blood pressure management, dressing maintenance  Patient Goals: pain management, comfort  Family Goals: LENNY    Progress made toward(s) clinical / shift goals:      Problem: Pain - Standard  Goal: Alleviation of pain or a reduction in pain to the patient’s comfort goal  Outcome: Progressing  Note: R hand pain mgmt with PRN oxy with relief.     Problem: Knowledge Deficit - Standard  Goal: Patient and family/care givers will demonstrate understanding of plan of care, disease process/condition, diagnostic tests and medications  Outcome: Progressing  Note: R hand drsg C/D/I. Pending cultures.

## 2025-01-16 NOTE — DISCHARGE PLANNING
Care Transition Team Discharge Planning    Anticipated Discharge Information  Discharge Disposition: Discharged to home/self care (01)  Discharge Address: [facility]  [place] [address]  Discharge Contact Phone Number: [phone]    Discharge Plan:  PT recommending no needs, OT recommending outpatient therapy. RNCM will continue to follow patient for DCP needs. Patient not medically clear at this time.

## 2025-01-16 NOTE — PROGRESS NOTES
Hospital Medicine Daily Progress Note    Date of Service  1/15/2025    Chief Complaint  Juan Carlos Sibley is a 64 y.o. male admitted 1/12/2025 with     Hospital Course  No notes on file    Interval Problem Update  1/13 Has acute right wrist pain and swelling that is still very painful to  Plan for OR today with Ortho  1/14 still with significant wrist pain although patient feels he is healing.  Consulted ID Dr. Alonso.  Echo pending.   1/15: Still having significant wrist pain.  Continuing pain regimen.  No bacterial growth on this culture yet.  Total time 53 minutes.    I have discussed this patient's plan of care and discharge plan at IDT rounds today with Case Management, Nursing, Nursing leadership, and other members of the IDT team.    Disposition  The patient is not medically cleared for discharge to home or a post-acute facility.      I have placed the appropriate orders for post-discharge needs.    Review of Systems  Review of Systems   Constitutional:  Negative for chills and fever.   Respiratory:  Negative for cough.    Cardiovascular:  Negative for chest pain.   Genitourinary:  Negative for dysuria.   Musculoskeletal:  Positive for joint pain.   Neurological:  Negative for dizziness.        Physical Exam  Temp:  [36.3 °C (97.3 °F)-37 °C (98.6 °F)] 36.7 °C (98 °F)  Pulse:  [63-74] 65  Resp:  [16-18] 18  BP: (136-168)/(80-98) 151/98  SpO2:  [91 %-93 %] 92 %    Physical Exam  Constitutional:       General: He is not in acute distress.     Appearance: He is not toxic-appearing.   HENT:      Head: Normocephalic and atraumatic.      Mouth/Throat:      Mouth: Mucous membranes are moist.   Eyes:      Extraocular Movements: Extraocular movements intact.   Cardiovascular:      Rate and Rhythm: Normal rate.   Pulmonary:      Effort: Pulmonary effort is normal.   Abdominal:      Palpations: Abdomen is soft.   Musculoskeletal:      Comments: Right wrist swollen and tender   Skin:     General: Skin is warm and dry.          Fluids    Intake/Output Summary (Last 24 hours) at 1/15/2025 1638  Last data filed at 1/15/2025 0944  Gross per 24 hour   Intake 880 ml   Output 1000 ml   Net -120 ml        Laboratory  Recent Labs     01/13/25  0105 01/15/25  0128   WBC 7.7 5.5   RBC 4.17* 4.04*   HEMOGLOBIN 12.7* 12.4*   HEMATOCRIT 38.3* 37.6*   MCV 91.8 93.1   MCH 30.5 30.7   MCHC 33.2 33.0   RDW 46.5 45.4   PLATELETCT 260 261   MPV 10.2 10.3     Recent Labs     01/13/25  0105 01/15/25  0128   SODIUM 137 140   POTASSIUM 4.4 4.3   CHLORIDE 103 103   CO2 25 27   GLUCOSE 118* 142*   BUN 16 20   CREATININE 1.08 0.91   CALCIUM 8.6 8.3*                     Imaging  US-RENAL   Final Result      1.  Moderate left hydronephrosis and hydroureter.   2.  Debris in the bladder lumen with a large posterior bladder diverticulum measuring 12.5 x 8.5 x 5.3 cm in diameter.   3.  Bladder volume is 590 mL.   4.  Bilateral medical renal disease.   5.  Incidentally noted is a simple cyst in the right lobe of the liver measuring 4 x 4 0.2 x 3.2 cm in diameter.      US-EXTREMITY ARTERY UPPER UNILAT RIGHT   Final Result      CT-CTA COMPLETE THORACOABDOMINAL AORTA   Final Result      1.  No thoracic or abdominal aortic aneurysm or dissection.   2.  Mild bilateral dependent atelectasis.   3.  LEFT hydronephrosis with possible stone in the distal ureter although ureters not completely imaged.  Distal LEFT ureter displaced medially by large bladder diverticulum.                           DX-WRIST-LIMITED 2- RIGHT   Final Result      1.  No fracture or dislocation of RIGHT wrist.   2.  Moderate osteoarthritis   3.  Dorsal soft tissue swelling.      DX-CHEST-PORTABLE (1 VIEW)   Final Result      No acute cardiopulmonary disease.      EC-ECHOCARDIOGRAM COMPLETE W/O CONT    (Results Pending)        Assessment/Plan  Acute hypoxic respiratory failure (HCC)  Assessment & Plan  CTA thoracoabdominal aorta showed mild bilateral dependent atelectasis   proBNP ~900, patient  appears euvolemic  Chest x-ray with no acute pathology  Will check for COVID-19  Incentive spirometry  Wean oxygen as tolerated      Leukocytosis  Assessment & Plan  Secondary to right wrist infection versus inflammation    Hypokalemia  Assessment & Plan  Replating as needed    Wrist pain, acute, right- (present on admission)  Assessment & Plan  Complaining of right wrist pain with swelling for the past 2 days  Associated with chills, and pain radiates to the elbow and right jaw  Status post arthrocentesis in ED  Plain film showed osteoarthritis and dorsum soft tissue swelling  US venous with DVT  uric acid unremarkable  Received IV Ancef  Status post ortho I&D, surgical culture pending  ID Dr. Alonso consulted  Antibiotics per ID    Advance care planning  Assessment & Plan  Full code    Homeless  Assessment & Plan  Reports he is homeless     Hydronephrosis  Assessment & Plan  Incidental finding on CT imaging  LEFT hydronephrosis with possible stone in the distal ureter although ureters not completely imaged.  Distal LEFT ureter displaced medially by large bladder diverticulum  Normal renal function   Ultrasound also shows moderate left hydroureter and hydronephrosis        Drug abuse (HCC)  Assessment & Plan  Report intermittently uses meth  Last use was 24 hours ago prior to admission  Patient states that he has been smoking meth  Urine positive for amphetamine      Hypertensive urgency  Assessment & Plan  Continue home medication   IV BP meds with parameters   Continue closely monitoring    Chronic combined systolic and diastolic heart failure (EF 40%)- (present on admission)  Assessment & Plan  Hx of   proBNP in   Patient is euvolemic  Continues Aldactone, Entresto amlodipine, and losartan, and Coreg  Repeat echo pending      Chest pain- (present on admission)  Assessment & Plan  Complaining substernal chest pain and has been radiating to the right jaw and arm pain  Troponin negative in ED  proBNP is  mildly elevated  EKG showed Sinus rhythm VCD, consider atypical RBBB  LVH with IVCD, LAD and secondary repol abnrm   Will trend troponin   Lipid low HDL, TSH wnl, A1c 5.8%  Echo ordered   Monitor on tele            VTE prophylaxis: enoxaparin    I have performed a physical exam and reviewed and updated ROS and Plan today (1/15/2025). In review of yesterday's note (1/14/2025), there are no changes except as documented above.

## 2025-01-17 VITALS
RESPIRATION RATE: 16 BRPM | TEMPERATURE: 98.5 F | SYSTOLIC BLOOD PRESSURE: 137 MMHG | DIASTOLIC BLOOD PRESSURE: 67 MMHG | OXYGEN SATURATION: 92 % | HEART RATE: 61 BPM | HEIGHT: 77 IN | BODY MASS INDEX: 24.7 KG/M2 | WEIGHT: 209.22 LBS

## 2025-01-17 LAB
ALBUMIN SERPL BCP-MCNC: 3.1 G/DL (ref 3.2–4.9)
BUN SERPL-MCNC: 24 MG/DL (ref 8–22)
CALCIUM ALBUM COR SERPL-MCNC: 9.6 MG/DL (ref 8.5–10.5)
CALCIUM SERPL-MCNC: 8.9 MG/DL (ref 8.5–10.5)
CHLORIDE SERPL-SCNC: 103 MMOL/L (ref 96–112)
CO2 SERPL-SCNC: 28 MMOL/L (ref 20–33)
CREAT SERPL-MCNC: 1.14 MG/DL (ref 0.5–1.4)
ERYTHROCYTE [DISTWIDTH] IN BLOOD BY AUTOMATED COUNT: 44.8 FL (ref 35.9–50)
GFR SERPLBLD CREATININE-BSD FMLA CKD-EPI: 71 ML/MIN/1.73 M 2
GLUCOSE SERPL-MCNC: 114 MG/DL (ref 65–99)
HCT VFR BLD AUTO: 37.2 % (ref 42–52)
HGB BLD-MCNC: 12.3 G/DL (ref 14–18)
MCH RBC QN AUTO: 30.4 PG (ref 27–33)
MCHC RBC AUTO-ENTMCNC: 33.1 G/DL (ref 32.3–36.5)
MCV RBC AUTO: 92.1 FL (ref 81.4–97.8)
PHOSPHATE SERPL-MCNC: 3.6 MG/DL (ref 2.5–4.5)
PLATELET # BLD AUTO: 305 K/UL (ref 164–446)
PMV BLD AUTO: 9.8 FL (ref 9–12.9)
POTASSIUM SERPL-SCNC: 4.4 MMOL/L (ref 3.6–5.5)
RBC # BLD AUTO: 4.04 M/UL (ref 4.7–6.1)
SODIUM SERPL-SCNC: 138 MMOL/L (ref 135–145)
WBC # BLD AUTO: 6.3 K/UL (ref 4.8–10.8)

## 2025-01-17 PROCEDURE — A9270 NON-COVERED ITEM OR SERVICE: HCPCS | Performed by: HOSPITALIST

## 2025-01-17 PROCEDURE — 700102 HCHG RX REV CODE 250 W/ 637 OVERRIDE(OP): Performed by: HOSPITALIST

## 2025-01-17 PROCEDURE — 700102 HCHG RX REV CODE 250 W/ 637 OVERRIDE(OP)

## 2025-01-17 PROCEDURE — 80069 RENAL FUNCTION PANEL: CPT

## 2025-01-17 PROCEDURE — A9270 NON-COVERED ITEM OR SERVICE: HCPCS | Performed by: STUDENT IN AN ORGANIZED HEALTH CARE EDUCATION/TRAINING PROGRAM

## 2025-01-17 PROCEDURE — 700102 HCHG RX REV CODE 250 W/ 637 OVERRIDE(OP): Performed by: STUDENT IN AN ORGANIZED HEALTH CARE EDUCATION/TRAINING PROGRAM

## 2025-01-17 PROCEDURE — 85027 COMPLETE CBC AUTOMATED: CPT

## 2025-01-17 PROCEDURE — 99239 HOSP IP/OBS DSCHRG MGMT >30: CPT | Performed by: STUDENT IN AN ORGANIZED HEALTH CARE EDUCATION/TRAINING PROGRAM

## 2025-01-17 PROCEDURE — A9270 NON-COVERED ITEM OR SERVICE: HCPCS

## 2025-01-17 RX ORDER — COLCHICINE 0.6 MG/1
0.6 TABLET ORAL DAILY
Qty: 30 TABLET | Refills: 0 | Status: SHIPPED | OUTPATIENT
Start: 2025-01-18 | End: 2025-01-17

## 2025-01-17 RX ORDER — OXYCODONE HYDROCHLORIDE 10 MG/1
10 TABLET ORAL EVERY 6 HOURS PRN
Qty: 12 TABLET | Refills: 0 | Status: SHIPPED | OUTPATIENT
Start: 2025-01-17 | End: 2025-01-20

## 2025-01-17 RX ORDER — OXYCODONE HYDROCHLORIDE 10 MG/1
10 TABLET ORAL EVERY 6 HOURS PRN
Qty: 12 TABLET | Refills: 0 | Status: SHIPPED | OUTPATIENT
Start: 2025-01-17 | End: 2025-01-17

## 2025-01-17 RX ORDER — ACETAMINOPHEN 325 MG/1
650 TABLET ORAL EVERY 6 HOURS PRN
Qty: 30 TABLET | Refills: 0 | Status: SHIPPED | OUTPATIENT
Start: 2025-01-17 | End: 2025-01-17

## 2025-01-17 RX ORDER — ACETAMINOPHEN 325 MG/1
650 TABLET ORAL EVERY 6 HOURS PRN
Qty: 30 TABLET | Refills: 0 | Status: SHIPPED | OUTPATIENT
Start: 2025-01-17

## 2025-01-17 RX ORDER — COLCHICINE 0.6 MG/1
0.6 TABLET ORAL DAILY
Qty: 30 TABLET | Refills: 0 | Status: SHIPPED | OUTPATIENT
Start: 2025-01-18

## 2025-01-17 RX ADMIN — ROSUVASTATIN CALCIUM 40 MG: 20 TABLET, FILM COATED ORAL at 04:33

## 2025-01-17 RX ADMIN — GABAPENTIN 300 MG: 300 CAPSULE ORAL at 16:49

## 2025-01-17 RX ADMIN — COLCHICINE 0.6 MG: 0.6 TABLET, FILM COATED ORAL at 04:33

## 2025-01-17 RX ADMIN — Medication 1 TABLET: at 04:35

## 2025-01-17 RX ADMIN — GABAPENTIN 300 MG: 300 CAPSULE ORAL at 11:43

## 2025-01-17 RX ADMIN — SPIRONOLACTONE 25 MG: 25 TABLET ORAL at 16:49

## 2025-01-17 RX ADMIN — AMLODIPINE BESYLATE 5 MG: 5 TABLET ORAL at 04:35

## 2025-01-17 RX ADMIN — FINASTERIDE 5 MG: 5 TABLET, FILM COATED ORAL at 04:34

## 2025-01-17 RX ADMIN — TAMSULOSIN HYDROCHLORIDE 0.4 MG: 0.4 CAPSULE ORAL at 04:35

## 2025-01-17 RX ADMIN — CARVEDILOL 12.5 MG: 12.5 TABLET, FILM COATED ORAL at 16:49

## 2025-01-17 RX ADMIN — OXYCODONE 5 MG: 5 TABLET ORAL at 04:34

## 2025-01-17 RX ADMIN — OXYCODONE HYDROCHLORIDE 10 MG: 10 TABLET ORAL at 09:10

## 2025-01-17 RX ADMIN — SPIRONOLACTONE 25 MG: 25 TABLET ORAL at 04:33

## 2025-01-17 RX ADMIN — SACUBITRIL AND VALSARTAN 1 TABLET: 49; 51 TABLET, FILM COATED ORAL at 04:33

## 2025-01-17 RX ADMIN — ASPIRIN 81 MG: 81 TABLET, COATED ORAL at 04:33

## 2025-01-17 RX ADMIN — SACUBITRIL AND VALSARTAN 1 TABLET: 49; 51 TABLET, FILM COATED ORAL at 16:49

## 2025-01-17 RX ADMIN — OXYCODONE HYDROCHLORIDE 10 MG: 10 TABLET ORAL at 14:55

## 2025-01-17 RX ADMIN — GABAPENTIN 300 MG: 300 CAPSULE ORAL at 04:33

## 2025-01-17 RX ADMIN — CARVEDILOL 12.5 MG: 12.5 TABLET, FILM COATED ORAL at 09:08

## 2025-01-17 ASSESSMENT — PAIN DESCRIPTION - PAIN TYPE
TYPE: ACUTE PAIN
TYPE: ACUTE PAIN;SURGICAL PAIN

## 2025-01-17 NOTE — CARE PLAN
The patient is Stable - Low risk of patient condition declining or worsening    Shift Goals  Clinical Goals: hemodynamic stability  Patient Goals: pain control  Family Goals: jason    Progress made toward(s) clinical / shift goals:    Problem: Pain - Standard  Goal: Alleviation of pain or a reduction in pain to the patient’s comfort goal by end of shift  Outcome: Progressing     Problem: Fall Risk  Goal: Patient will remain free from falls by end of shift  Outcome: Progressing       Patient is not progressing towards the following goals:

## 2025-01-17 NOTE — DISCHARGE SUMMARY
Discharge Summary    CHIEF COMPLAINT ON ADMISSION  Chief Complaint   Patient presents with    Chest Pain     R wrist pain radiates to chest       Reason for Admission  ems     Admission Date  1/12/2025    CODE STATUS  Full Code    HPI & HOSPITAL COURSE  64-year-old homeless male with a past medical history of hypertension, PTSD, smoking, and methamphetamine abuse was admitted on 1/12/2025 for right wrist swelling concerning for septic arthritis.  He was started on broad-spectrum antibiotics as well as pain management.  Orthopedic surgery following for which patient underwent right wrist incision and draining on 1/13/2025 and was noted for immediate expression of cloudy fluid and gouty tophi intermixed.  Infectious disease following for which 4 cultures with no growth to date and thus antibiotics were discontinued on 1/15/2025.  He was started on colchicine with marked improvement of swelling and pain.  Patient tolerating meals without difficulty.  Stable patient within chronic condition was discharged home on Tylenol, colchicine, oxycodone and was instructed to follow-up with orthopedic surgery as well as wound care in the outpatient setting for which referrals were placed.    All results and plan of action were discussed with the patient for which she voiced understanding and agreement with the primary care team.  Patient was instructed to return to emergency department if symptoms were to worsen.    Therefore, he is discharged in good and stable condition to home with close outpatient follow-up.    The patient met 2-midnight criteria for an inpatient stay at the time of discharge.    Discharge Date  1/17/2025    FOLLOW UP ITEMS POST DISCHARGE  Primary care provider follow posthospital discharge care  Outpatient orthopedic surgery follow-up post or care    DISCHARGE DIAGNOSES  Active Problems:    Chest pain (POA: Yes)    Chronic combined systolic and diastolic heart failure (EF 40%) (POA: Yes)    Hypertensive  urgency (POA: Unknown)    Drug abuse (HCC) (POA: Unknown)    Hydronephrosis (POA: Unknown)    Homeless (POA: Unknown)    Advance care planning (POA: Unknown)    Wrist pain, acute, right (POA: Yes)    Hypokalemia (POA: Unknown)    Leukocytosis (POA: Unknown)    Acute hypoxic respiratory failure (HCC) (POA: Unknown)  Resolved Problems:    * No resolved hospital problems. *      FOLLOW UP  No future appointments.  No follow-up provider specified.    MEDICATIONS ON DISCHARGE     Medication List        START taking these medications        Instructions   acetaminophen 325 MG Tabs  Commonly known as: Tylenol   Take 2 Tablets by mouth every 6 hours as needed for Mild Pain.  Dose: 650 mg     colchicine 0.6 MG Tabs  Start taking on: January 18, 2025  Commonly known as: Colcrys   Take 1 Tablet by mouth every day.  Dose: 0.6 mg     oxyCODONE immediate release 10 MG immediate release tablet  Commonly known as: Roxicodone   Take 1 Tablet by mouth every 6 hours as needed for Severe Pain for up to 3 days.  Dose: 10 mg            CONTINUE taking these medications        Instructions   amLODIPine 5 MG Tabs  Commonly known as: Norvasc   Take 5 mg by mouth every day.  Dose: 5 mg     aspirin 81 MG EC tablet   Take 81 mg by mouth every day.  Dose: 81 mg     carvedilol 12.5 MG Tabs  Commonly known as: Coreg   Take 12.5 mg by mouth 2 times a day with meals.  Dose: 12.5 mg     Entresto 49-51 MG Tabs  Generic drug: sacubitril-valsartan   Take 1 Tablet by mouth 2 times a day.  Dose: 1 Tablet     finasteride 5 MG Tabs  Commonly known as: Proscar   Take 5 mg by mouth every day.  Dose: 5 mg     gabapentin 300 MG Caps  Commonly known as: Neurontin   Take 1 Cap by mouth 3 times a day.  Dose: 300 mg     rosuvastatin 40 MG tablet  Commonly known as: Crestor   Take 40 mg by mouth every day.  Dose: 40 mg     sertraline 100 MG Tabs  Commonly known as: Zoloft   Take 100 mg by mouth every day.  Dose: 100 mg     spironolactone 25 MG Tabs  Commonly known  "as: Aldactone   Take 25 mg by mouth 2 times a day.  Dose: 25 mg     tamsulosin 0.4 MG capsule  Commonly known as: Flomax   Take 0.4 mg by mouth every day.  Dose: 0.4 mg     therapeutic multivitamin-minerals Tabs   Take 1 Tablet by mouth every day.  Dose: 1 Tablet            STOP taking these medications      lidocaine 5 % Oint  Commonly known as: Xylocaine     losartan 50 MG Tabs  Commonly known as: Cozaar              Allergies  Allergies   Allergen Reactions    Flexeril [Cyclobenzaprine Hcl] Anaphylaxis     Throat swelling    Lisinopril Cough    Terazosin Unspecified     [facility]    Valproic Acid Rash     \"rash on my chest and my eyes go out of focus\"    Vancomycin Itching    Bee Venom        DIET  Orders Placed This Encounter   Procedures    Diet Order Diet: Cardiac     Standing Status:   Standing     Number of Occurrences:   1     Order Specific Question:   Diet:     Answer:   Cardiac [6]       ACTIVITY  As tolerated.  Weight bearing as tolerated    CONSULTATIONS  Orthopedic surgery  Infect disease    PROCEDURES  Incision and drainage right wrist     LABORATORY  Lab Results   Component Value Date    SODIUM 138 01/17/2025    POTASSIUM 4.4 01/17/2025    CHLORIDE 103 01/17/2025    CO2 28 01/17/2025    GLUCOSE 114 (H) 01/17/2025    BUN 24 (H) 01/17/2025    CREATININE 1.14 01/17/2025        Lab Results   Component Value Date    WBC 6.3 01/17/2025    HEMOGLOBIN 12.3 (L) 01/17/2025    HEMATOCRIT 37.2 (L) 01/17/2025    PLATELETCT 305 01/17/2025        Total time of the discharge process exceeds 34 minutes.  "

## 2025-01-17 NOTE — PROGRESS NOTES
Report to MARITZA Gayle on s5, pt off unit with transport, all personal belongings sent with patient

## 2025-01-17 NOTE — CARE PLAN
The patient is Stable - Low risk of patient condition declining or worsening    Shift Goals  Clinical Goals: pain control, VSS  Patient Goals: pain control, rest  Family Goals: LENNY    Progress made toward(s) clinical / shift goals:    Problem: Pain - Standard  Goal: Alleviation of pain or a reduction in pain to the patient’s comfort goal  Outcome: Progressing     Problem: Hemodynamics  Goal: Patient's hemodynamics, fluid balance and neurologic status will be stable or improve  Outcome: Progressing       Patient is not progressing towards the following goals:

## 2025-01-17 NOTE — PROGRESS NOTES
Patient arrived to unit via personal wheelchair. All belongings gathered, patient pivoted from wheel chair to bed. Bed alarm is on, fall precautions in place. Patient is alert and oriented, call light within reach.

## 2025-01-17 NOTE — PROGRESS NOTES
"      Orthopaedic Progress Note    Interval changes:  Patient doing well    R wrist dressings are CDI  Cultures with no growth      ROS - Patient denies any new issues.  Pain well controlled.    BP (!) 135/99   Pulse 62   Temp 36.2 °C (97.2 °F) (Temporal)   Resp 16   Ht 1.956 m (6' 5\")   Wt 94.9 kg (209 lb 3.5 oz)   SpO2 91%     Patient seen and examined  No acute distress  Breathing non labored  RRR  R wrist dressings CDI, DNVI, moves all fingers, cap refill <2 sec.     Recent Labs     01/15/25  0128 01/16/25  0140   WBC 5.5 6.4   RBC 4.04* 4.04*   HEMOGLOBIN 12.4* 12.3*   HEMATOCRIT 37.6* 36.8*   MCV 93.1 91.1   MCH 30.7 30.4   MCHC 33.0 33.4   RDW 45.4 44.0   PLATELETCT 261 284   MPV 10.3 10.1       Active Hospital Problems    Diagnosis     Hypertensive urgency [I16.0]     Drug abuse (HCC) [F19.10]     Hydronephrosis [N13.30]     Homeless [Z59.00]     Advance care planning [Z71.89]     Wrist pain, acute, right [M25.531]     Hypokalemia [E87.6]     Leukocytosis [D72.829]     Acute hypoxic respiratory failure (HCC) [J96.01]     Chronic combined systolic and diastolic heart failure (EF 40%) [I50.42]     Chest pain [R07.9]        Assessment/Plan:  Patient doing well    R wrist dressings are CDI  Cultures with no growth    POD#3 Incision and drainage right wrist   Wt bearing status - light WBAT until incision heals  Wound care/Drains - Dressings to be changed every other day by nursing. Or PRN for saturation starting POD#2  Future Procedures - none planned   Lovenox: Start 1/12, Duration-until ambulatory > 150'  Sutures/Staples out- 14-21 days post operatively. Removal will completed by ortho mid levels only.  PT/OT-initiated  Antibiotics: completed   DVT Prophylaxis- TEDS/SCDs/Foot pumps  Loaiza-not needed per ortho  Case Coordination for Discharge Planning - Disposition per therapy recs.    "

## 2025-01-17 NOTE — PROGRESS NOTES
Bedside report received from off going RN/tech: Shade, assumed care of patient.     Fall Risk Score: MODERATE RISK  Fall risk interventions in place: Provide patient/family education based on risk assessment, Educate patient/family to call staff for assistance when getting out of bed, Place patient in room close to nursing station, Utilize bed/chair fall alarm, and Bed alarm connected correctly  Bed type: Regular (Brian Score less than 17 interventions in place)  Patient on cardiac monitor: No   IVF/IV medications: Not Applicable   Oxygen: Room Air  Bedside sitter: Not Applicable   Isolation: Not applicable

## 2025-01-17 NOTE — PROGRESS NOTES
4 Eyes Skin Assessment Completed by MARITZA Gayle and MARITZA Cortez.    Head WDL  Ears WDL  Nose WDL  Mouth Missing lower dentures  Neck WDL  Breast/Chest WDL  Shoulder Blades WDL  Spine WDL  (R) Arm/Elbow/Hand WDL  (L) Arm/Elbow/Hand WDL  Abdomen WDL  Groin WDL  Scrotum/Coccyx/Buttocks Redness and Blanching  (R) Leg WDL  (L) Leg WDL  (R) Heel/Foot/Toe Scab  (L) Heel/Foot/Toe Bump, dryness          Devices In Places Pulse Ox      Interventions In Place Heel Mepilex and Pillows    Possible Skin Injury Yes    Pictures Uploaded Into Epic Yes  Wound Consult Placed Yes  RN Wound Prevention Protocol Ordered Yes

## 2025-01-18 LAB
BACTERIA SPEC ANAEROBE CULT: NORMAL
SIGNIFICANT IND 70042: NORMAL
SITE SITE: NORMAL
SOURCE SOURCE: NORMAL

## 2025-01-18 NOTE — PROGRESS NOTES
Discharge orders received.  Patient arrived to the discharge lounge.  PIV removed.  Instructions given, medications reviewed and general discharge education provided to patient.  Follow up appointments discussed.  Patient verbalized understanding of dc instructions and prescriptions.  Patient signed discharge instructions.  Patient verbalized understanding had all belongings with him. Pending medications- messaged pharmacy. Pt to leave with a bus pass. Wished patient a speedy recovery.      1846: messaged Dr. Rivas to request written Rx [facility]       1857: Dr. Rivas notified us that [facility]. Care Aidharish Koehler notified pt as he was in men's restroom.

## 2025-01-21 ENCOUNTER — PATIENT OUTREACH (OUTPATIENT)
Dept: HEALTH INFORMATION MANAGEMENT | Facility: OTHER | Age: 65
End: 2025-01-21
Payer: COMMERCIAL

## 2025-01-21 NOTE — PROGRESS NOTES
Community Health Worker Hung attempted to reach the patient after discharge from the hospital to follow up. Patient did not answer and CHW left a detailed voicemail requesting a call back.     [facility] Radha Persaud will attempt again at a later date.

## 2025-01-23 NOTE — PROGRESS NOTES
Community Health Worker Intake    Contact information provided to Juan Carlos Sibley   Outpatient assessment completed.  Did the patient receive medications post discharge: Yes    CHW Jazmin called the patient to follow up.     [facility] as he states his wound on his hand is causing him irritation and wants his staples/sutures to be removed.   CHW advised the pt that he is not due for suture removal until 14-21 days post procedure which was on 1/10/2025.   Hugh Caldwell M.D. is with [facility].    Per Samantha at [employer], pt makes too much income and does not qualify for their housing program.     Pt states he will call once he knows more / if he needs anything.     Plan: CHW will follow up.

## 2025-01-28 NOTE — DOCUMENTATION QUERY
[facility]                                                                       Query Response Note      PATIENT:               BETZAIDA SCHAEFER  ACCT #:                  [id number]  MRN:                     [id number]  :                      1960  ADMIT DATE:       2025 12:31 PM  DISCH DATE:        2025 7:00 PM  RESPONDING  PROVIDER #:        [id number]           QUERY TEXT:    Please clarify status of septic arthritis:    The patient's clinical indicators include:  DC Summary: admitted 25 for right wrist swelling concerning for septic arthritis    Progress Note:  Wrist pain, acute, right:  Cultures negative, dc'd antibiotics.  Now treating for gout with colchicine started on   H&P and Progress Notes:  Leukocytosis: secondary to right wrist cellulitis  Leukocytosis secondary to right wrist infection vs inflammation.    Op Report: Pre op diagnosis: R wrist septic arthritis   Using a Schnidt I was able to enter this bluntly and then spread and there was immediate expression of cloudy fluid. There appeared to be gouty tophi intermixed.     R wrist culture: negative, no growth at 72 hours    R wrist surgical pathology: postoperative diagnosis: right wrist septic arthritis   Risk Factors: wrist pain  Treatment: Ancef (last dose 1/15), I&D     Important Note:  if new diagnosis or change to documentation made via query please include in daily documentation and/or DC Summary    Thank you,  Pooja Petty RN, BSN, CCDS  Clinical   Connect via Chat& (ChatAnd)  Options provided:   -- Septic arthritis is ruled in   -- Septic arthritis is ruled out   -- Other explanation, (Please specify the other explanation)   -- Unable to determine      Query created by: Pooja Petty on 2025 6:58 AM    RESPONSE TEXT:    Septic arthritis is ruled in          Electronically signed by:  LIANET JONES  RONY PINTO MD 1/28/2025 7:26 AM

## 2025-01-30 NOTE — PROGRESS NOTES
Community Health Worker Hung attempted to reach the patient to follow up. Patient did not answer and CHW left a detailed voicemail requesting a call back.     [facility] Radha Persaud will attempt again at a later date.

## 2025-02-05 NOTE — PROGRESS NOTES
MUNA Wu will discharge patient from CCM services due to lack of contact after x3 TC attempts 02/05/25 .

## 2025-07-16 ENCOUNTER — TELEPHONE (OUTPATIENT)
Dept: HEALTH INFORMATION MANAGEMENT | Facility: OTHER | Age: 65
End: 2025-07-16
Payer: COMMERCIAL

## (undated) DEVICE — SODIUM CHL IRRIGATION 0.9% 1000ML (12EA/CA)

## (undated) DEVICE — GLOVE BIOGEL PI INDICATOR SZ 8.0 SURGICAL PF LF -(50/BX 4BX/CA)

## (undated) DEVICE — SET LEADWIRE 5 LEAD BEDSIDE DISPOSABLE ECG (1SET OF 5/EA)

## (undated) DEVICE — LACTATED RINGERS INJ 1000 ML - (14EA/CA 60CA/PF)

## (undated) DEVICE — PACK LOWER EXTREMITY - (2/CA)

## (undated) DEVICE — SENSOR OXIMETER ADULT SPO2 RD SET (20EA/BX)

## (undated) DEVICE — CUP DENTURE W/ LID - (200/CA)

## (undated) DEVICE — ELECTRODE DUAL RETURN W/ CORD - (50/PK)

## (undated) DEVICE — GOWN WARMING STANDARD FLEX - (30/CA)

## (undated) DEVICE — GLOVE BIOGEL PI INDICATOR SZ 6.5 SURGICAL PF LF - (50/BX 4BX/CA)

## (undated) DEVICE — COVER LIGHT HANDLE ALC PLUS DISP (18EA/BX)

## (undated) DEVICE — SLEEVE, VASO, THIGH, MED

## (undated) DEVICE — GLOVE SZ 6.5 BIOGEL PI MICRO - PF LF (50PR/BX)

## (undated) DEVICE — SODIUM CHL. IRRIGATION 0.9% 3000ML (4EA/CA 65CA/PF)

## (undated) DEVICE — PADDING CAST 4 IN STERILE - 4 X 4 YDS (24/CA)

## (undated) DEVICE — DRAPE SURGICAL U 77X120 - (10/CA)

## (undated) DEVICE — BANDAGE ELASTIC 6 HONEYCOMB - 6X5YD LF (20/CA)"

## (undated) DEVICE — BANDAGE ELASTIC 4 HONEYCOMB - 4"X5YD LF (20/CA)"

## (undated) DEVICE — PAD LAP STERILE 18 X 18 - (5/PK 40PK/CA)

## (undated) DEVICE — SUCTION INSTRUMENT YANKAUER BULBOUS TIP W/O VENT (50EA/CA)

## (undated) DEVICE — CHLORAPREP 26 ML APPLICATOR - ORANGE TINT(25/CA)

## (undated) DEVICE — SUTURE 2-0 VICRYL PLUS CT-1 - 8 X 18 INCH(12/BX)

## (undated) DEVICE — CLOSURE SKIN STRIP 1/4 X 3 IN - (STERI STRIP) (50/BX)

## (undated) DEVICE — SUTURE 3-0 ETHILON PS-1 (36PK/BX)

## (undated) DEVICE — PADDING CAST 6 IN STERILE - 6 X 4 YDS (24/CA)

## (undated) DEVICE — STAPLER SKIN DISP - (6/BX 10BX/CA) VISISTAT

## (undated) DEVICE — GLOVE BIOGEL PI ORTHO SZ 7.5 PF LF (40PR/BX)

## (undated) DEVICE — SWAB ANAEROBIC SPEC.COLLECTOR - (25/PK 4PK/CA 100EA/CA)

## (undated) DEVICE — SUTURE GENERAL

## (undated) DEVICE — SET EXTENSION WITH 2 PORTS (48EA/CA) ***PART #2C8610 IS A SUBSTITUTE*****

## (undated) DEVICE — TUBING CLEARLINK DUO-VENT - C-FLO (48EA/CA)

## (undated) DEVICE — CANISTER SUCTION 3000ML MECHANICAL FILTER AUTO SHUTOFF MEDI-VAC NONSTERILE LF DISP (40EA/CA)